# Patient Record
Sex: FEMALE | Race: ASIAN | NOT HISPANIC OR LATINO | Employment: FULL TIME | URBAN - METROPOLITAN AREA
[De-identification: names, ages, dates, MRNs, and addresses within clinical notes are randomized per-mention and may not be internally consistent; named-entity substitution may affect disease eponyms.]

---

## 2024-03-07 ENCOUNTER — OFFICE VISIT (OUTPATIENT)
Dept: OBSTETRICS AND GYNECOLOGY | Age: 19
End: 2024-03-07
Payer: MEDICAID

## 2024-03-07 VITALS
SYSTOLIC BLOOD PRESSURE: 108 MMHG | HEIGHT: 62 IN | DIASTOLIC BLOOD PRESSURE: 70 MMHG | WEIGHT: 135 LBS | BODY MASS INDEX: 24.84 KG/M2

## 2024-03-07 DIAGNOSIS — Z34.90 EARLY STAGE OF PREGNANCY: Primary | ICD-10-CM

## 2024-03-07 RX ORDER — PRENATAL VIT/IRON FUM/FOLIC AC 27MG-0.8MG
TABLET ORAL DAILY
COMMUNITY

## 2024-03-07 NOTE — PROGRESS NOTES
"GYN Visit    3/7/2024    Patient: Eryn Humphrey          MR#:[id number]      Chief Complaint   Patient presents with    Possible Pregnancy     New Gyn & Confirmation US - LMP 24, Pt has no pap hx, pt c/o nausea & vomiting at night       History of Present Illness    19 y.o. female  who presents for  new pregnancy visit  New pt to me  Patient is having nausea and vomiting but mildly  Faint positive home pregnancy test on   No vaginal bleeding  She does have some light cramping  She had a Nexplanon removed 2024  No history of prior Pap smear  No history of gonorrhea chlamydia herpes or warts  She declines nausea medications today  She is on a prenatal vitamin  She has had chickenpox in the past  No genetic disorders in her or her boyfriend's family that would affect babies  She is at [employer] in school for [employer] science        Patient's last menstrual period was 2024.    ________________________________________  There is no problem list on file for this patient.      Past Medical History:   Diagnosis Date    Depression 2018       History reviewed. No pertinent surgical history.    Social History     Tobacco Use   Smoking Status Never    Passive exposure: Never   Smokeless Tobacco Never       has a current medication list which includes the following prescription(s): prenatal vitamin 27-0.8.  ________________________________________    Current contraception: none      The following portions of the patient's history were reviewed and updated as appropriate: allergies, current medications, past family history, past medical history, past social history, past surgical history, and problem list.    Review of Systems    Pertinent items are noted in HPI.     Objective   Physical Exam    /70   Ht 157.5 cm (62\")   Wt 61.2 kg (135 lb)   LMP 2024   BMI 24.69 kg/m²    BP Readings from Last 3 Encounters:   24 108/70      Wt Readings from Last 3 Encounters:   24 61.2 kg " "(135 lb)      BMI: Estimated body mass index is 24.69 kg/m² as calculated from the following:    Height as of this encounter: 157.5 cm (62\").    Weight as of this encounter: 61.2 kg (135 lb).    Lungs: non labored breathing, no wheezing or tachpnea  Extremities: extremities normal, atraumatic, no cyanosis or edema  Skin: Skin color, texture, turgor normal. No rashes or lesions  Neurologic: Grossly normal  General:   alert, appears stated age, and cooperative   Abdomen: soft, non-tender, without masses or organomegaly            See ultrasound 5-week embryo with no fetal heart tones today, otherwise normal ultrasound                 Assessment:      Diagnoses and all orders for this visit:    1. Early stage of pregnancy (Primary)      Reviewed ultrasound with patient and her boyfriend  No fetal heart tones today but we might be just a few days early  Recommend follow-up in 1 week for a repeat ultrasound  I did discuss possibility of miscarriage  The patient is having some pregnancy symptoms and her home pregnancy test fits the timeline so I am hopeful that this will be a viable pregnancy  We will defer her labs until next week        "

## 2024-03-08 ENCOUNTER — PATIENT ROUNDING (BHMG ONLY) (OUTPATIENT)
Dept: OBSTETRICS AND GYNECOLOGY | Age: 19
End: 2024-03-08
Payer: MEDICAID

## 2024-03-14 ENCOUNTER — OFFICE VISIT (OUTPATIENT)
Dept: OBSTETRICS AND GYNECOLOGY | Age: 19
End: 2024-03-14
Payer: MEDICAID

## 2024-03-14 VITALS
WEIGHT: 132 LBS | HEIGHT: 62 IN | DIASTOLIC BLOOD PRESSURE: 72 MMHG | BODY MASS INDEX: 24.29 KG/M2 | SYSTOLIC BLOOD PRESSURE: 110 MMHG

## 2024-03-14 DIAGNOSIS — Z3A.01 6 WEEKS GESTATION OF PREGNANCY: ICD-10-CM

## 2024-03-14 DIAGNOSIS — O21.9 NAUSEA AND VOMITING OF PREGNANCY, ANTEPARTUM: ICD-10-CM

## 2024-03-14 DIAGNOSIS — Z34.90 EARLY STAGE OF PREGNANCY: Primary | ICD-10-CM

## 2024-03-14 RX ORDER — ONDANSETRON 4 MG/1
4 TABLET, FILM COATED ORAL EVERY 8 HOURS PRN
Qty: 30 TABLET | Refills: 1 | Status: SHIPPED | OUTPATIENT
Start: 2024-03-14 | End: 2025-03-14

## 2024-03-14 RX ORDER — PROMETHAZINE HYDROCHLORIDE 12.5 MG/1
12.5 TABLET ORAL EVERY 6 HOURS PRN
Qty: 30 TABLET | Refills: 1 | Status: SHIPPED | OUTPATIENT
Start: 2024-03-14

## 2024-03-14 NOTE — PROGRESS NOTES
"GYN Visit    3/14/2024    Patient: Eryn Humphrey          MR#:[id number]      Chief Complaint   Patient presents with    Possible Pregnancy     Gyn F/u & Viability US - LMP 24, Pt c/o nausea, Pt stating she had 2 days of light pink spotting but states she has not had any today       History of Present Illness    19 y.o. female  who presents for follow-up to dating ultrasound    The patient has been having some nausea and vomiting  She is able to keep down her prenatal vitamin  Ultrasound was done today and it showed a viable 6-week intrauterine pregnancy  Her due date will be 2024 based on this ultrasound  Labs done today  Follow-up 4 weeks for zurima        Patient's last menstrual period was 2024.    ________________________________________  There is no problem list on file for this patient.      Past Medical History:   Diagnosis Date    Depression 2018       History reviewed. No pertinent surgical history.    Social History     Tobacco Use   Smoking Status Never    Passive exposure: Never   Smokeless Tobacco Never       has a current medication list which includes the following prescription(s): prenatal vitamin 27-0.8, ondansetron, and promethazine.  ________________________________________    Current contraception: none      The following portions of the patient's history were reviewed and updated as appropriate: allergies, current medications, past family history, past medical history, past social history, past surgical history, and problem list.    Review of Systems    Pertinent items are noted in HPI.     Objective   Physical Exam    /72   Ht 157.5 cm (62\")   Wt 59.9 kg (132 lb)   LMP 2024   BMI 24.14 kg/m²    BP Readings from Last 3 Encounters:   24 110/72   24 108/70      Wt Readings from Last 3 Encounters:   24 59.9 kg (132 lb)   24 61.2 kg (135 lb)      BMI: Estimated body mass index is 24.14 kg/m² as calculated from the following:    " "Height as of this encounter: 157.5 cm (62\").    Weight as of this encounter: 59.9 kg (132 lb).    Lungs: non labored breathing, no wheezing or tachpnea  Extremities: extremities normal, atraumatic, no cyanosis or edema  Skin: Skin color, texture, turgor normal. No rashes or lesions  Neurologic: Grossly normal  General:   alert, appears stated age, and cooperative   Abdomen: soft, non-tender, without masses or organomegaly            See ultrasound report viable IUP at 6 weeks  She will be dated by this ultrasound which places her due date at 11/4/2024                 Assessment:      Diagnoses and all orders for this visit:    1. Early stage of pregnancy (Primary)    2. 6 weeks gestation of pregnancy  -     OB Panel With HIV  -     Urine Culture - Urine, Urine, Clean Catch  -     Chlamydia / Gonococcus / Mycoplasma Genitalium, ALEXEY, Urine - Urine, Clean Catch    3. Nausea and vomiting of pregnancy, antepartum    Other orders  -     promethazine (PHENERGAN) 12.5 MG tablet; Take 1 tablet by mouth Every 6 (Six) Hours As Needed for Nausea or Vomiting.  Dispense: 30 tablet; Refill: 1  -     ondansetron (Zofran) 4 MG tablet; Take 1 tablet by mouth Every 8 (Eight) Hours As Needed for Nausea or Vomiting.  Dispense: 30 tablet; Refill: 1      Follow-up 4 weeks for panorama and Horizon        "

## 2024-03-16 LAB
BACTERIA UR CULT: NO GROWTH
BACTERIA UR CULT: NORMAL

## 2024-03-17 LAB
C TRACH RRNA UR QL NAA+PROBE: NEGATIVE
M GENITALIUM DNA UR QL NAA+PROBE: NEGATIVE
N GONORRHOEA RRNA UR QL NAA+PROBE: NEGATIVE

## 2024-03-18 ENCOUNTER — LAB (OUTPATIENT)
Facility: HOSPITAL | Age: 19
End: 2024-03-18
Payer: MEDICAID

## 2024-03-18 LAB
ABO GROUP BLD: NORMAL
BASOPHILS # BLD AUTO: 0.02 10*3/MM3 (ref 0–0.2)
BASOPHILS NFR BLD AUTO: 0.3 % (ref 0–1.5)
BLD GP AB SCN SERPL QL: NEGATIVE
DEPRECATED RDW RBC AUTO: 36.8 FL (ref 37–54)
EOSINOPHIL # BLD AUTO: 0.06 10*3/MM3 (ref 0–0.4)
EOSINOPHIL NFR BLD AUTO: 0.8 % (ref 0.3–6.2)
ERYTHROCYTE [DISTWIDTH] IN BLOOD BY AUTOMATED COUNT: 12.2 % (ref 12.3–15.4)
HBV SURFACE AG SERPL QL IA: NORMAL
HCT VFR BLD AUTO: 42.2 % (ref 34–46.6)
HCV AB SER DONR QL: NORMAL
HGB BLD-MCNC: 13.7 G/DL (ref 12–15.9)
HIV 1+2 AB+HIV1 P24 AG SERPL QL IA: NORMAL
IMM GRANULOCYTES # BLD AUTO: 0.02 10*3/MM3 (ref 0–0.05)
IMM GRANULOCYTES NFR BLD AUTO: 0.3 % (ref 0–0.5)
LYMPHOCYTES # BLD AUTO: 2.21 10*3/MM3 (ref 0.7–3.1)
LYMPHOCYTES NFR BLD AUTO: 28.7 % (ref 19.6–45.3)
MCH RBC QN AUTO: 26.7 PG (ref 26.6–33)
MCHC RBC AUTO-ENTMCNC: 32.5 G/DL (ref 31.5–35.7)
MCV RBC AUTO: 82.3 FL (ref 79–97)
MONOCYTES # BLD AUTO: 0.7 10*3/MM3 (ref 0.1–0.9)
MONOCYTES NFR BLD AUTO: 9.1 % (ref 5–12)
NEUTROPHILS NFR BLD AUTO: 4.68 10*3/MM3 (ref 1.7–7)
NEUTROPHILS NFR BLD AUTO: 60.8 % (ref 42.7–76)
NRBC BLD AUTO-RTO: 0 /100 WBC (ref 0–0.2)
PLATELET # BLD AUTO: 335 10*3/MM3 (ref 140–450)
PMV BLD AUTO: 9.4 FL (ref 6–12)
RBC # BLD AUTO: 5.13 10*6/MM3 (ref 3.77–5.28)
RH BLD: POSITIVE
RPR SER QL: NORMAL
WBC NRBC COR # BLD AUTO: 7.69 10*3/MM3 (ref 3.4–10.8)

## 2024-03-19 LAB — RUBV IGG SERPL IA-ACNC: 16.4 INDEX

## 2024-04-04 ENCOUNTER — HOSPITAL ENCOUNTER (EMERGENCY)
Facility: HOSPITAL | Age: 19
Discharge: HOME | End: 2024-04-04
Attending: EMERGENCY MEDICINE
Payer: MEDICAID

## 2024-04-04 ENCOUNTER — TELEPHONE (OUTPATIENT)
Dept: OBSTETRICS AND GYNECOLOGY | Age: 19
End: 2024-04-04
Payer: MEDICAID

## 2024-04-04 VITALS
OXYGEN SATURATION: 100 % | HEIGHT: 62 IN | HEART RATE: 77 BPM | DIASTOLIC BLOOD PRESSURE: 67 MMHG | BODY MASS INDEX: 24.29 KG/M2 | SYSTOLIC BLOOD PRESSURE: 116 MMHG | TEMPERATURE: 98 F | WEIGHT: 132 LBS | RESPIRATION RATE: 18 BRPM

## 2024-04-04 DIAGNOSIS — O21.9 NAUSEA AND VOMITING DURING PREGNANCY: ICD-10-CM

## 2024-04-04 DIAGNOSIS — N39.0 ACUTE UTI: Primary | ICD-10-CM

## 2024-04-04 LAB
ALBUMIN SERPL-MCNC: 4.3 G/DL (ref 3.5–5.2)
ALBUMIN/GLOB SERPL: 1.3 G/DL
ALP SERPL-CCNC: 65 U/L (ref 39–117)
ALT SERPL W P-5'-P-CCNC: 10 U/L (ref 1–33)
ANION GAP SERPL CALCULATED.3IONS-SCNC: 9.4 MMOL/L (ref 5–15)
AST SERPL-CCNC: 14 U/L (ref 1–32)
BACTERIA UR QL AUTO: ABNORMAL /HPF
BASOPHILS # BLD AUTO: 0.02 10*3/MM3 (ref 0–0.2)
BASOPHILS NFR BLD AUTO: 0.2 % (ref 0–1.5)
BILIRUB SERPL-MCNC: 0.3 MG/DL (ref 0–1.2)
BILIRUB UR QL STRIP: NEGATIVE
BUN SERPL-MCNC: 9 MG/DL (ref 6–20)
BUN/CREAT SERPL: 15 (ref 7–25)
CALCIUM SPEC-SCNC: 9.2 MG/DL (ref 8.6–10.5)
CHLORIDE SERPL-SCNC: 103 MMOL/L (ref 98–107)
CLARITY UR: ABNORMAL
CO2 SERPL-SCNC: 23.6 MMOL/L (ref 22–29)
COLOR UR: YELLOW
CREAT SERPL-MCNC: 0.6 MG/DL (ref 0.57–1)
DEPRECATED RDW RBC AUTO: 37.4 FL (ref 37–54)
EGFRCR SERPLBLD CKD-EPI 2021: 131.2 ML/MIN/1.73
EOSINOPHIL # BLD AUTO: 0.05 10*3/MM3 (ref 0–0.4)
EOSINOPHIL NFR BLD AUTO: 0.5 % (ref 0.3–6.2)
ERYTHROCYTE [DISTWIDTH] IN BLOOD BY AUTOMATED COUNT: 12.8 % (ref 12.3–15.4)
GLOBULIN UR ELPH-MCNC: 3.2 GM/DL
GLUCOSE SERPL-MCNC: 93 MG/DL (ref 65–99)
GLUCOSE UR STRIP-MCNC: NEGATIVE MG/DL
HCG INTACT+B SERPL-ACNC: NORMAL MIU/ML
HCT VFR BLD AUTO: 40.9 % (ref 34–46.6)
HGB BLD-MCNC: 13.5 G/DL (ref 12–15.9)
HGB UR QL STRIP.AUTO: NEGATIVE
HOLD SPECIMEN: NORMAL
HOLD SPECIMEN: NORMAL
HYALINE CASTS UR QL AUTO: ABNORMAL /LPF
IMM GRANULOCYTES # BLD AUTO: 0.04 10*3/MM3 (ref 0–0.05)
IMM GRANULOCYTES NFR BLD AUTO: 0.4 % (ref 0–0.5)
KETONES UR QL STRIP: ABNORMAL
LEUKOCYTE ESTERASE UR QL STRIP.AUTO: ABNORMAL
LYMPHOCYTES # BLD AUTO: 2.43 10*3/MM3 (ref 0.7–3.1)
LYMPHOCYTES NFR BLD AUTO: 23.2 % (ref 19.6–45.3)
MCH RBC QN AUTO: 27.1 PG (ref 26.6–33)
MCHC RBC AUTO-ENTMCNC: 33 G/DL (ref 31.5–35.7)
MCV RBC AUTO: 82.1 FL (ref 79–97)
MONOCYTES # BLD AUTO: 0.79 10*3/MM3 (ref 0.1–0.9)
MONOCYTES NFR BLD AUTO: 7.5 % (ref 5–12)
NEUTROPHILS NFR BLD AUTO: 68.2 % (ref 42.7–76)
NEUTROPHILS NFR BLD AUTO: 7.14 10*3/MM3 (ref 1.7–7)
NITRITE UR QL STRIP: NEGATIVE
NRBC BLD AUTO-RTO: 0 /100 WBC (ref 0–0.2)
PH UR STRIP.AUTO: 6 [PH] (ref 5–8)
PLATELET # BLD AUTO: 359 10*3/MM3 (ref 140–450)
PMV BLD AUTO: 9.3 FL (ref 6–12)
POTASSIUM SERPL-SCNC: 4.1 MMOL/L (ref 3.5–5.2)
PROT SERPL-MCNC: 7.5 G/DL (ref 6–8.5)
PROT UR QL STRIP: ABNORMAL
RBC # BLD AUTO: 4.98 10*6/MM3 (ref 3.77–5.28)
RBC # UR STRIP: ABNORMAL /HPF
REF LAB TEST METHOD: ABNORMAL
SODIUM SERPL-SCNC: 136 MMOL/L (ref 136–145)
SP GR UR STRIP: >=1.03 (ref 1–1.03)
SQUAMOUS #/AREA URNS HPF: ABNORMAL /HPF
UROBILINOGEN UR QL STRIP: ABNORMAL
WBC # UR STRIP: ABNORMAL /HPF
WBC NRBC COR # BLD AUTO: 10.47 10*3/MM3 (ref 3.4–10.8)
WHOLE BLOOD HOLD COAG: NORMAL
WHOLE BLOOD HOLD SPECIMEN: NORMAL

## 2024-04-04 PROCEDURE — 96361 HYDRATE IV INFUSION ADD-ON: CPT

## 2024-04-04 PROCEDURE — 81001 URINALYSIS AUTO W/SCOPE: CPT | Performed by: NURSE PRACTITIONER

## 2024-04-04 PROCEDURE — 99283 EMERGENCY DEPT VISIT LOW MDM: CPT

## 2024-04-04 PROCEDURE — 25810000003 SODIUM CHLORIDE 0.9 % SOLUTION: Performed by: NURSE PRACTITIONER

## 2024-04-04 PROCEDURE — 96374 THER/PROPH/DIAG INJ IV PUSH: CPT

## 2024-04-04 PROCEDURE — 87086 URINE CULTURE/COLONY COUNT: CPT | Performed by: EMERGENCY MEDICINE

## 2024-04-04 PROCEDURE — 85025 COMPLETE CBC W/AUTO DIFF WBC: CPT | Performed by: NURSE PRACTITIONER

## 2024-04-04 PROCEDURE — 80053 COMPREHEN METABOLIC PANEL: CPT | Performed by: NURSE PRACTITIONER

## 2024-04-04 PROCEDURE — 25010000002 ONDANSETRON PER 1 MG: Performed by: NURSE PRACTITIONER

## 2024-04-04 PROCEDURE — 84702 CHORIONIC GONADOTROPIN TEST: CPT | Performed by: NURSE PRACTITIONER

## 2024-04-04 RX ORDER — CEPHALEXIN 500 MG/1
500 CAPSULE ORAL ONCE
Status: COMPLETED | OUTPATIENT
Start: 2024-04-04 | End: 2024-04-04

## 2024-04-04 RX ORDER — ONDANSETRON 2 MG/ML
4 INJECTION INTRAMUSCULAR; INTRAVENOUS ONCE
Status: COMPLETED | OUTPATIENT
Start: 2024-04-04 | End: 2024-04-04

## 2024-04-04 RX ORDER — CEPHALEXIN 500 MG/1
500 CAPSULE ORAL 3 TIMES DAILY
Qty: 21 CAPSULE | Refills: 0 | Status: SHIPPED | OUTPATIENT
Start: 2024-04-04 | End: 2024-04-11

## 2024-04-04 RX ORDER — SODIUM CHLORIDE 0.9 % (FLUSH) 0.9 %
10 SYRINGE (ML) INJECTION AS NEEDED
Status: DISCONTINUED | OUTPATIENT
Start: 2024-04-04 | End: 2024-04-04 | Stop reason: HOSPADM

## 2024-04-04 RX ADMIN — ONDANSETRON 4 MG: 2 INJECTION INTRAMUSCULAR; INTRAVENOUS at 20:25

## 2024-04-04 RX ADMIN — SODIUM CHLORIDE 1000 ML: 9 INJECTION, SOLUTION INTRAVENOUS at 20:20

## 2024-04-04 RX ADMIN — CEPHALEXIN 500 MG: 500 CAPSULE ORAL at 21:27

## 2024-04-04 NOTE — TELEPHONE ENCOUNTER
Please advise pt to go to ER for IV fluids if she is not able to hold down anything.   I called her and left a VM as well

## 2024-04-04 NOTE — TELEPHONE ENCOUNTER
----- Message from Eryn Humphrey sent at 4/4/2024  4:31 PM EDT -----  Regarding: Next appt  Contact: [phone]  Hi, sorry to bother but for the past two days I haven’t been able to keep anything down , not even a sip of water. I have been able to chew on some ice chips and I have taken zofran but I still manage to vomit. My concern is on march 31 I weigh myself and it was 138 and then on April 3 I reweigh myself and it was 132. I have an anxious feeling that maybe the baby isn’t getting enough nutrients , as I am also not able to keep down my prenatal and I’m afraid of miscarriage. Will a Doppler be enough on our next appointment to make sure that the baby is still growing? Thank you for your time.

## 2024-04-05 NOTE — DISCHARGE INSTRUCTIONS
You have been given emergency department evaluation.  This evaluation is intended to rule out life-threatening conditions.  Is not a complete evaluation.  You could require further testing as determined by your primary care physician or any referred specialist.  Please follow-up with all doctors that you are referred to.  Please be sure to take your prescribed medications and follow any specific instructions in the discharge instructions.  Please follow-up with your primary care physician within 48 hours.  Please have your primary care provider recheck your blood pressure.  Rest.  Drink plenty of fluids, stay hydrated.  Follow-up with your obgyn for further evaluation and management of symptoms.  Please return to the emergency department if you experience chest pain, shortness of breath, abdominal pain, fever greater than 102, intractable vomiting.  Please return to the emergency department if your symptoms continue or worsen, or if you begin to experience any other concerning symptom.

## 2024-04-05 NOTE — ED PROVIDER NOTES
EMERGENCY DEPARTMENT ENCOUNTER  Room Number:  26/26  PCP: Jo Leo APRN  Independent Historians: Patient      HPI:  Chief Complaint: had concerns including Vomiting During Pregnancy.     A complete HPI/ROS/PMH/PSH/SH/FH are unobtainable due to: None    Chronic or social conditions impacting patient care (Social Determinants of Health): None      Context: Eryn Humphrey is a 19 y.o. female who presents to the emergency department with complaints of nausea and vomiting.  Symptoms started 3 days ago.  Patient reports she is currently 9 weeks pregnant.  This is her first pregnancy.  She is followed by Dr. Delgado with OB/GYN.  Patient is unaware of any alleviating or aggravating factors.  Patient denies fever, chills, headache, lightheadedness, dizziness, numbness, tingling, unilateral extremity weakness, syncope, shortness of breath, chest pain, abdominal pain, diarrhea, dysuria, hematuria, vaginal bleeding, vaginal discharge, or other complaints.    Review of prior external notes (non-ED) -and- Review of prior external test results outside of this encounter:   March 14, 2024: OB/GYN office visit.  Patient was seen by Dr. Dawkins/Danny.  It was noted her last menstrual cycle was January 21, 2024.  Patient had verbalized some nausea and vomiting.  Medication list included prenatal vitamin, 3 days withdrawn, and promethazine.    PAST MEDICAL HISTORY  Active Ambulatory Problems     Diagnosis Date Noted    No Active Ambulatory Problems     Resolved Ambulatory Problems     Diagnosis Date Noted    No Resolved Ambulatory Problems     Past Medical History:   Diagnosis Date    Depression 12/2018         PAST SURGICAL HISTORY  No past surgical history on file.      FAMILY HISTORY  Family History   Problem Relation Age of Onset    Diabetes Maternal Grandmother     Hypertension Maternal Grandmother     Breast cancer Paternal Grandmother          SOCIAL HISTORY  Social History     Socioeconomic History    Marital status:     Tobacco Use    Smoking status: Never     Passive exposure: Never    Smokeless tobacco: Never   Vaping Use    Vaping status: Never Used   Substance and Sexual Activity    Alcohol use: Never    Drug use: Never    Sexual activity: Yes     Partners: Male     Birth control/protection: None         ALLERGIES  Patient has no known allergies.      REVIEW OF SYSTEMS  Included in HPI  All systems reviewed and negative except for those discussed in HPI.      PHYSICAL EXAM    I have reviewed the triage vital signs and nursing notes.    ED Triage Vitals   Temp Heart Rate Resp BP SpO2   04/04/24 1955 04/04/24 1955 04/04/24 1955 04/04/24 2001 04/04/24 1955   98 °F (36.7 °C) 89 18 128/83 98 %      Temp src Heart Rate Source Patient Position BP Location FiO2 (%)   04/04/24 1955 04/04/24 1955 04/04/24 2001 04/04/24 2001 --   Tympanic Monitor Sitting Right arm        GENERAL: not distressed  HENT: nares patent  Head/neck/ face are symmetric without gross deformity or swelling  EYES: no scleral icterus  CV: regular rhythm, regular rate with intact distal pulses  RESPIRATORY: normal effort and no respiratory distress  ABDOMEN: soft and non-tender  MUSCULOSKELETAL: no deformity  NEURO: alert and appropriate, moves all extremities, follows commands  SKIN: warm, dry    Vital signs and nursing notes reviewed.      LAB RESULTS  Recent Results (from the past 24 hour(s))   Green Top (Gel)    Collection Time: 04/04/24  8:16 PM   Result Value Ref Range    Extra Tube Hold for add-ons.    Lavender Top    Collection Time: 04/04/24  8:16 PM   Result Value Ref Range    Extra Tube hold for add-on    Gold Top - SST    Collection Time: 04/04/24  8:16 PM   Result Value Ref Range    Extra Tube Hold for add-ons.    Light Blue Top    Collection Time: 04/04/24  8:16 PM   Result Value Ref Range    Extra Tube Hold for add-ons.    Comprehensive Metabolic Panel    Collection Time: 04/04/24  8:16 PM    Specimen: Blood   Result Value Ref Range    Glucose  93 65 - 99 mg/dL    BUN 9 6 - 20 mg/dL    Creatinine 0.60 0.57 - 1.00 mg/dL    Sodium 136 136 - 145 mmol/L    Potassium 4.1 3.5 - 5.2 mmol/L    Chloride 103 98 - 107 mmol/L    CO2 23.6 22.0 - 29.0 mmol/L    Calcium 9.2 8.6 - 10.5 mg/dL    Total Protein 7.5 6.0 - 8.5 g/dL    Albumin 4.3 3.5 - 5.2 g/dL    ALT (SGPT) 10 1 - 33 U/L    AST (SGOT) 14 1 - 32 U/L    Alkaline Phosphatase 65 39 - 117 U/L    Total Bilirubin 0.3 0.0 - 1.2 mg/dL    Globulin 3.2 gm/dL    A/G Ratio 1.3 g/dL    BUN/Creatinine Ratio 15.0 7.0 - 25.0    Anion Gap 9.4 5.0 - 15.0 mmol/L    eGFR 131.2 >60.0 mL/min/1.73   hCG, Quantitative, Pregnancy    Collection Time: 04/04/24  8:16 PM    Specimen: Blood   Result Value Ref Range    HCG Quantitative 77,082.00 mIU/mL   CBC Auto Differential    Collection Time: 04/04/24  8:16 PM    Specimen: Blood   Result Value Ref Range    WBC 10.47 3.40 - 10.80 10*3/mm3    RBC 4.98 3.77 - 5.28 10*6/mm3    Hemoglobin 13.5 12.0 - 15.9 g/dL    Hematocrit 40.9 34.0 - 46.6 %    MCV 82.1 79.0 - 97.0 fL    MCH 27.1 26.6 - 33.0 pg    MCHC 33.0 31.5 - 35.7 g/dL    RDW 12.8 12.3 - 15.4 %    RDW-SD 37.4 37.0 - 54.0 fl    MPV 9.3 6.0 - 12.0 fL    Platelets 359 140 - 450 10*3/mm3    Neutrophil % 68.2 42.7 - 76.0 %    Lymphocyte % 23.2 19.6 - 45.3 %    Monocyte % 7.5 5.0 - 12.0 %    Eosinophil % 0.5 0.3 - 6.2 %    Basophil % 0.2 0.0 - 1.5 %    Immature Grans % 0.4 0.0 - 0.5 %    Neutrophils, Absolute 7.14 (H) 1.70 - 7.00 10*3/mm3    Lymphocytes, Absolute 2.43 0.70 - 3.10 10*3/mm3    Monocytes, Absolute 0.79 0.10 - 0.90 10*3/mm3    Eosinophils, Absolute 0.05 0.00 - 0.40 10*3/mm3    Basophils, Absolute 0.02 0.00 - 0.20 10*3/mm3    Immature Grans, Absolute 0.04 0.00 - 0.05 10*3/mm3    nRBC 0.0 0.0 - 0.2 /100 WBC   Urinalysis With Microscopic If Indicated (No Culture) - Urine, Clean Catch    Collection Time: 04/04/24  8:24 PM    Specimen: Urine, Clean Catch   Result Value Ref Range    Color, UA Yellow Yellow, Straw    Appearance, UA  Cloudy (A) Clear    pH, UA 6.0 5.0 - 8.0    Specific Gravity, UA >=1.030 1.005 - 1.030    Glucose, UA Negative Negative    Ketones, UA 15 mg/dL (1+) (A) Negative    Bilirubin, UA Negative Negative    Blood, UA Negative Negative    Protein, UA Trace (A) Negative    Leuk Esterase, UA Small (1+) (A) Negative    Nitrite, UA Negative Negative    Urobilinogen, UA 1.0 E.U./dL 0.2 - 1.0 E.U./dL   Urinalysis, Microscopic Only - Urine, Clean Catch    Collection Time: 04/04/24  8:24 PM    Specimen: Urine, Clean Catch   Result Value Ref Range    RBC, UA 0-2 None Seen, 0-2 /HPF    WBC, UA 21-50 (A) None Seen, 0-2 /HPF    Bacteria, UA 3+ (A) None Seen /HPF    Squamous Epithelial Cells, UA 7-12 (A) None Seen, 0-2 /HPF    Hyaline Casts, UA 7-12 None Seen /LPF    Methodology Automated Microscopy          RADIOLOGY  No Radiology Exams Resulted Within Past 24 Hours      MEDICATIONS GIVEN IN ER  Medications   sodium chloride 0.9 % flush 10 mL (has no administration in time range)   sodium chloride 0.9 % bolus 1,000 mL (0 mL Intravenous Stopped 4/4/24 2144)   ondansetron (ZOFRAN) injection 4 mg (4 mg Intravenous Given 4/4/24 2025)   cephalexin (KEFLEX) capsule 500 mg (500 mg Oral Given 4/4/24 2127)           OUTPATIENT MEDICATION MANAGEMENT:  Current Facility-Administered Medications Ordered in Epic   Medication Dose Route Frequency Provider Last Rate Last Admin    sodium chloride 0.9 % flush 10 mL  10 mL Intravenous PRN Delano Oliva MD         Current Outpatient Medications Ordered in Epic   Medication Sig Dispense Refill    cephalexin (KEFLEX) 500 MG capsule Take 1 capsule by mouth 3 (Three) Times a Day for 7 days. 21 capsule 0    ondansetron (Zofran) 4 MG tablet Take 1 tablet by mouth Every 8 (Eight) Hours As Needed for Nausea or Vomiting. 30 tablet 1    Prenatal Vit-Fe Fumarate-FA (prenatal vitamin 27-0.8) 27-0.8 MG tablet tablet Take  by mouth Daily.      promethazine (PHENERGAN) 12.5 MG tablet Take 1 tablet by mouth Every 6  (Six) Hours As Needed for Nausea or Vomiting. 30 tablet 1             PROGRESS, DATA ANALYSIS, CONSULTS, AND MEDICAL DECISION MAKING  ORDERS PLACED DURING THIS VISIT:  Orders Placed This Encounter   Procedures    Urine Culture - Urine,    [place] Draw    Comprehensive Metabolic Panel    hCG, Quantitative, Pregnancy    Urinalysis With Microscopic If Indicated (No Culture) - Urine, Clean Catch    CBC Auto Differential    Urinalysis, Microscopic Only - Urine, Clean Catch    Insert peripheral IV    Green Top (Gel)    Lavender Top    Gold Top - SST    Light Blue Top    CBC & Differential       All labs have been independently interpreted by me.  All radiology studies have been reviewed by me. All EKG's have been independently viewed by me.  Discussion below represents my analysis of pertinent findings related to patient's condition, differential diagnosis, treatment plan and final disposition.    Differential diagnosis includes but is not limited to:   Hyperemesis gravidarum, gastroenteritis, UTI, etc.    ED Course/Progress Notes/MDM:  ED Course as of 04/04/24 2147   Thu Apr 04, 2024 2032 I discussed the case with Dr. Oliva and they agree to evaluate the patient at the bedside.    [AR]   2044 WBC: 10.47 [DC]   2044 Hemoglobin: 13.5 [DC]   2044 Glucose: 93 [DC]   2044 BUN: 9 [DC]   2044 Creatinine: 0.60 [DC]   2044 Sodium: 136 [DC]   2044 Potassium: 4.1 [DC]   2044 Squamous Epithelial Cells, UA(!): 7-12 [DC]   2044 Bacteria, UA(!): 3+ [DC]   2044 WBC, UA(!): 21-50 [DC]   2044 Nitrite, UA: Negative [DC]   2044 Leukocytes, UA(!): Small (1+) [DC]   2044 Ketones, UA(!): 15 mg/dL (1+) [DC]   2058 Ketones, UA(!): 15 mg/dL (1+) [AR]   2058 Leukocytes, UA(!): Small (1+) [AR]   2058 WBC, UA(!): 21-50 [AR]   2058 Bacteria, UA(!): 3+ [AR]   2058 Squamous Epithelial Cells, UA(!): 7-12 [AR]   2058 HCG Quantitative: 77,082.00 [AR]   2147 You have been given emergency department evaluation.  This evaluation is intended to rule out  life-threatening conditions.  Is not a complete evaluation.  You could require further testing as determined by your primary care physician or any referred specialist.  Please follow-up with all doctors that you are referred to.  Please be sure to take your prescribed medications and follow any specific instructions in the discharge instructions.  Please follow-up with your primary care physician within 48 hours.  Please have your primary care provider recheck your blood pressure.  Please return to the emergency department if you experience chest pain, shortness of breath, abdominal pain, fever greater than 102, intractable vomiting.  Please return to the emergency department if your symptoms continue or worsen, or if you begin to experience any other concerning symptom.    Patient able to tolerate po fluids without nausea or vomiting. [AR]      ED Course User Index  [AR] Ada Watson APRN  [DC] Delano Oliva MD     D/C Instructions:  You have been given emergency department evaluation.  This evaluation is intended to rule out life-threatening conditions.  Is not a complete evaluation.  You could require further testing as determined by your primary care physician or any referred specialist.  Please follow-up with all doctors that you are referred to.  Please be sure to take your prescribed medications and follow any specific instructions in the discharge instructions.  Please follow-up with your primary care physician within 48 hours.  Please have your primary care provider recheck your blood pressure.  Rest.  Drink plenty of fluids, stay hydrated.  Follow-up with your obgyn for further evaluation and management of symptoms.  Please return to the emergency department if you experience chest pain, shortness of breath, abdominal pain, fever greater than 102, intractable vomiting.  Please return to the emergency department if your symptoms continue or worsen, or if you begin to experience any other concerning  symptom.      AS OF 21:47 EDT VITALS:    BP - 116/67  HR - 77  TEMP - 98 °F (36.7 °C) (Tympanic)  O2 SATS - 100%        MDM:  Patient is a 19-year-old female who presents to the emergency department with complaints of nausea and vomiting over the last 3 days.  Patient is currently 9 weeks pregnant.  She tried Zofran without any relief.  Advised patient to take Phenergan for nausea and vomiting as well as she was prescribed this by her OB/GYN.  Patient had symptomatic relief while in ED.  She is able to tolerate p.o. fluids.  Urinalysis reveals bacteria, treated with Keflex.  Advise follow-up with OB/GYN.  Strict return precautions given.      COMPLEXITY OF CARE  Admission was considered but after careful review of the patient's presentation, physical examination, diagnostic results, and response to treatment the patient may be safely discharged with outpatient follow-up.        DIAGNOSIS  Final diagnoses:   Acute UTI   Nausea and vomiting during pregnancy         DISPOSITION  ED Disposition       ED Disposition   Discharge    Condition   Stable    Comment   --                    Please note that portions of this document were completed with a voice recognition program.    Note Disclaimer: At [facility], we believe that sharing information builds trust and better relationships. You are receiving this note because you recently visited [facility]. It is possible you will see health information before a provider has talked with you about it. This kind of information can be easy to misunderstand. To help you fully understand what it means for your health, we urge you to discuss this note with your provider.     Ada Watson, LUBA  04/04/24 0786

## 2024-04-05 NOTE — ED PROVIDER NOTES
Pt presents to the ED c/o intermittent nausea and vomiting of early pregnancy for the last 3 weeks, worse over the last 3 days with decreased urinary output.  No fevers or chills.  No vaginal bleeding.  Reports being approximately 9 weeks pregnant.  This is her first pregnancy.  No dysuria.     On exam,   General: No acute distress, nontoxic  HEENT: Mucous membranes moist, atraumatic, EOMI  Neck: Full ROM  Pulm: Symmetric chest rise, nonlabored, lungs CTAB  Cardiovascular: Regular rate and rhythm, intact distal pulses  GI: Soft, nontender, nondistended, no rebound, no guarding, bowel sounds present  MSK: Full ROM, no deformity  Skin: Warm, dry  Neuro: Awake, alert, oriented x 4, GCS 15, moving all extremities, no focal deficits  Psych: Calm, cooperative          Plan: Initial concern for dehydration, renal failure, liver failure, electrolyte abnormalities, among others.  Plan for labs, IV fluids, symptomatic control, and reevaluation with results.    ED Course as of 04/04/24 2235   Thu Apr 04, 2024 2032 I discussed the case with Dr. Oliva and they agree to evaluate the patient at the bedside.    [AR]   2044 WBC: 10.47 [DC]   2044 Hemoglobin: 13.5 [DC]   2044 Glucose: 93 [DC]   2044 BUN: 9 [DC]   2044 Creatinine: 0.60 [DC]   2044 Sodium: 136 [DC]   2044 Potassium: 4.1 [DC]   2044 Squamous Epithelial Cells, UA(!): 7-12 [DC]   2044 Bacteria, UA(!): 3+ [DC]   2044 WBC, UA(!): 21-50 [DC]   2044 Nitrite, UA: Negative [DC]   2044 Leukocytes, UA(!): Small (1+) [DC]   2044 Ketones, UA(!): 15 mg/dL (1+) [DC]   2058 Ketones, UA(!): 15 mg/dL (1+) [AR]   2058 Leukocytes, UA(!): Small (1+) [AR]   2058 WBC, UA(!): 21-50 [AR]   2058 Bacteria, UA(!): 3+ [AR]   2058 Squamous Epithelial Cells, UA(!): 7-12 [AR]   2058 HCG Quantitative: 77,082.00 [AR]   2147 You have been given emergency department evaluation.  This evaluation is intended to rule out life-threatening conditions.  Is not a complete evaluation.  You could require further  testing as determined by your primary care physician or any referred specialist.  Please follow-up with all doctors that you are referred to.  Please be sure to take your prescribed medications and follow any specific instructions in the discharge instructions.  Please follow-up with your primary care physician within 48 hours.  Please have your primary care provider recheck your blood pressure.  Please return to the emergency department if you experience chest pain, shortness of breath, abdominal pain, fever greater than 102, intractable vomiting.  Please return to the emergency department if your symptoms continue or worsen, or if you begin to experience any other concerning symptom.    Patient able to tolerate po fluids without nausea or vomiting. [AR]      ED Course User Index  [AR] Ada Watson APRN  [DC] Delano Oliva MD       Reassuring workup today, plan for outpatient OB/GYN follow-up, ED return for worsening symptoms as needed.     MD Attestation Note    SHARED VISIT: This visit was performed by BOTH a physician and an APC. The substantive portion of the medical decision making was performed by this attesting physician who made or approved the management plan and takes responsibility for patient management. All studies in the APC note (if performed) were independently interpreted by me.                   Delano Oliva MD  04/04/24 7810

## 2024-04-06 LAB — BACTERIA SPEC AEROBE CULT: ABNORMAL

## 2024-04-12 ENCOUNTER — ROUTINE PRENATAL (OUTPATIENT)
Dept: OBSTETRICS AND GYNECOLOGY | Age: 19
End: 2024-04-12
Payer: MEDICAID

## 2024-04-12 VITALS — SYSTOLIC BLOOD PRESSURE: 104 MMHG | DIASTOLIC BLOOD PRESSURE: 62 MMHG | WEIGHT: 133 LBS | BODY MASS INDEX: 24.33 KG/M2

## 2024-04-12 DIAGNOSIS — Z3A.10 10 WEEKS GESTATION OF PREGNANCY: Primary | ICD-10-CM

## 2024-04-12 DIAGNOSIS — Z31.430 ENCOUNTER OF FEMALE FOR TESTING FOR GENETIC DISEASE CARRIER STATUS FOR PROCREATIVE MANAGEMENT: ICD-10-CM

## 2024-04-12 DIAGNOSIS — Z34.81 PRENATAL CARE, SUBSEQUENT PREGNANCY, FIRST TRIMESTER: ICD-10-CM

## 2024-04-12 LAB
GLUCOSE UR STRIP-MCNC: NEGATIVE MG/DL
PROT UR STRIP-MCNC: ABNORMAL MG/DL

## 2024-04-12 RX ORDER — ASPIRIN 81 MG/1
81 TABLET ORAL DAILY
Start: 2024-04-12

## 2024-04-12 NOTE — PROGRESS NOTES
She is feeling better  I recommend she start a baby aspirin at 12 weeks due to being her first pregnancy  She will do the panorama and the Horizon today  Follow-up in 4 weeks    Chief Complaint   Patient presents with    Routine Prenatal Visit     New Ob - Pt is 10w4d, Pt seen in ER on 4/4/24 for acute UTI & nausea/vomiting, Cornelio today, Pt has no complaints today       HPI:  Pt presents for routine prenatal visit    ROS:  No fever or chills, no nausea or vomiting, no contractions, no leg pain, no LE edema, no leaking fluid, no bleeding, no headache, no dysuria  All other systems reviewed and negative    Exam:  See flow sheet  General:  Alert and oriented and no distress  Neck: no lymphadenopathy or thyromegaly  Lungs: non - labored breathing  Abd:  See flow sheet, fundus nontender  Ext: see flow sheet, non-tender bilateral , no lesions  Neuro: grossly normal    Assessment:/ PLAN:    Diagnoses and all orders for this visit:    1. 10 weeks gestation of pregnancy (Primary)  -     POC Urinalysis Dipstick    2. Encounter of female for testing for genetic disease carrier status for procreative management  -     Cornelio Horizon 14 (Pan-Ethnic Standard) - Blood,    3. Prenatal care, subsequent pregnancy, first trimester  -     Cornelio Panorama Prenatal Test: Chromosomes 13, 18, 21, X & Y: Triploidy 22Q.11.2 Deletion - Blood,    Other orders  -     aspirin 81 MG EC tablet; Take 1 tablet by mouth Daily.

## 2024-04-18 RX ORDER — ONDANSETRON 4 MG/1
4 TABLET, FILM COATED ORAL EVERY 8 HOURS PRN
Qty: 30 TABLET | Refills: 1 | Status: SHIPPED | OUTPATIENT
Start: 2024-04-18 | End: 2025-04-18

## 2024-04-24 LAB
Lab: ABNORMAL
Lab: POSITIVE
NTRA ALPHA-THALASSEMIA: POSITIVE
NTRA BETA-HEMOGLOBINOPATHIES: NEGATIVE
NTRA CANAVAN DISEASE: NEGATIVE
NTRA CYSTIC FIBROSIS: NEGATIVE
NTRA DUCHENNE/BECKER MUSCULAR DYSTROPHY: NEGATIVE
NTRA FAMILIAL DYSAUTONOMIA: NEGATIVE
NTRA FRAGILE X SYNDROME: NEGATIVE
NTRA GALACTOSEMIA: NEGATIVE
NTRA GAUCHER DISEASE: NEGATIVE
NTRA MEDIUM CHAIN ACYL-COA DEHYDROGENASE DEFICIENCY: NEGATIVE
NTRA POLYCYSTIC KIDNEY DISEASE, AUTOSOMAL RECESSIVE: NEGATIVE
NTRA SMITH-LEMLI-OPITZ SYNDROME: NEGATIVE
NTRA SPINAL MUSCULAR ATROPHY: NEGATIVE
NTRA TAY-SACHS DISEASE: NEGATIVE

## 2024-05-09 ENCOUNTER — ROUTINE PRENATAL (OUTPATIENT)
Dept: OBSTETRICS AND GYNECOLOGY | Age: 19
End: 2024-05-09
Payer: MEDICAID

## 2024-05-09 VITALS — WEIGHT: 131 LBS | BODY MASS INDEX: 23.96 KG/M2 | SYSTOLIC BLOOD PRESSURE: 102 MMHG | DIASTOLIC BLOOD PRESSURE: 60 MMHG

## 2024-05-09 DIAGNOSIS — Z3A.14 14 WEEKS GESTATION OF PREGNANCY: Primary | ICD-10-CM

## 2024-05-09 DIAGNOSIS — D56.3 ALPHA THALASSEMIA SILENT CARRIER: ICD-10-CM

## 2024-05-09 DIAGNOSIS — Z34.92 PRENATAL CARE, SECOND TRIMESTER: ICD-10-CM

## 2024-05-09 LAB
GLUCOSE UR STRIP-MCNC: NEGATIVE MG/DL
PROT UR STRIP-MCNC: NEGATIVE MG/DL

## 2024-06-13 ENCOUNTER — ROUTINE PRENATAL (OUTPATIENT)
Dept: OBSTETRICS AND GYNECOLOGY | Age: 19
End: 2024-06-13
Payer: MEDICAID

## 2024-06-13 VITALS — WEIGHT: 137 LBS | DIASTOLIC BLOOD PRESSURE: 70 MMHG | BODY MASS INDEX: 25.06 KG/M2 | SYSTOLIC BLOOD PRESSURE: 116 MMHG

## 2024-06-13 DIAGNOSIS — Z13.79 ENCOUNTER FOR GENETIC SCREENING FOR BIRTH DEFECT: ICD-10-CM

## 2024-06-13 DIAGNOSIS — Z36.89 ENCOUNTER FOR FETAL ANATOMIC SURVEY: ICD-10-CM

## 2024-06-13 DIAGNOSIS — Z36.86 ENCOUNTER FOR ANTENATAL SCREENING FOR CERVICAL LENGTH: ICD-10-CM

## 2024-06-13 DIAGNOSIS — Z13.89 SCREENING FOR HEMATURIA OR PROTEINURIA: Primary | ICD-10-CM

## 2024-06-13 DIAGNOSIS — Z3A.19 19 WEEKS GESTATION OF PREGNANCY: ICD-10-CM

## 2024-06-13 LAB
GLUCOSE UR STRIP-MCNC: NEGATIVE MG/DL
PROT UR STRIP-MCNC: NEGATIVE MG/DL

## 2024-06-13 NOTE — PROGRESS NOTES
Patient feels well  She had an anatomy scan today that was complete  No previa seen  Cervical length is 3.24 cm  Vertex presentation  It is a boy  AFP screen will be done today  Follow-up 4 weeks    Chief Complaint   Patient presents with    Routine Prenatal Visit     19w3d, anatomy scan today, pt c/o frequent nose bleeds        HPI:  Pt presents for routine prenatal visit    ROS:  No fever or chills, no nausea or vomiting, no contractions, no leg pain, no LE edema, no leaking fluid, no bleeding, no headache, no dysuria  All other systems reviewed and negative    Exam:  See flow sheet  General:  Alert and oriented and no distress  Neck: no lymphadenopathy or thyromegaly  Lungs: non - labored breathing  Abd:  See flow sheet, fundus nontender  Ext: see flow sheet, non-tender bilateral , no lesions  Neuro: grossly normal    Assessment:/ PLAN:    Diagnoses and all orders for this visit:    1. Screening for hematuria or proteinuria (Primary)  -     POC Urinalysis Dipstick    2. Encounter for fetal anatomic survey    3. Encounter for  screening for cervical length    4. Encounter for genetic screening for birth defect  -     Alpha Fetoprotein, Maternal    5. 19 weeks gestation of pregnancy  -     Alpha Fetoprotein, Maternal

## 2024-06-15 LAB
AFP INTERP SERPL-IMP: NORMAL
AFP INTERP SERPL-IMP: NORMAL
AFP MOM SERPL: 1.15
AFP SERPL-MCNC: 65.2 NG/ML
AGE AT DELIVERY: 21.7 YR
GA METHOD: NORMAL
GA: 19.4 WEEKS
IDDM PATIENT QL: NO
LABORATORY COMMENT REPORT: NORMAL
MULTIPLE PREGNANCY: NO
NEURAL TUBE DEFECT RISK FETUS: 7603 %
RESULT: NORMAL

## 2024-06-25 ENCOUNTER — HOSPITAL ENCOUNTER (EMERGENCY)
Facility: HOSPITAL | Age: 19
Discharge: HOME | End: 2024-06-25
Attending: OBSTETRICS & GYNECOLOGY | Admitting: OBSTETRICS & GYNECOLOGY
Payer: MEDICAID

## 2024-06-25 VITALS
BODY MASS INDEX: 25.21 KG/M2 | RESPIRATION RATE: 16 BRPM | WEIGHT: 137 LBS | HEIGHT: 62 IN | DIASTOLIC BLOOD PRESSURE: 77 MMHG | SYSTOLIC BLOOD PRESSURE: 128 MMHG | HEART RATE: 91 BPM | OXYGEN SATURATION: 99 % | TEMPERATURE: 98.1 F

## 2024-06-25 LAB
A1 MICROGLOB PLACENTAL VAG QL: NEGATIVE
BACTERIA UR QL AUTO: ABNORMAL /HPF
BILIRUB UR QL STRIP: NEGATIVE
CLARITY UR: CLEAR
COLOR UR: YELLOW
GLUCOSE UR STRIP-MCNC: NEGATIVE MG/DL
HGB UR QL STRIP.AUTO: NEGATIVE
HYALINE CASTS UR QL AUTO: ABNORMAL /LPF
KETONES UR QL STRIP: NEGATIVE
LEUKOCYTE ESTERASE UR QL STRIP.AUTO: ABNORMAL
NITRITE UR QL STRIP: NEGATIVE
PH UR STRIP.AUTO: 8.5 [PH] (ref 5–8)
PROT UR QL STRIP: NEGATIVE
RBC # UR STRIP: ABNORMAL /HPF
REF LAB TEST METHOD: ABNORMAL
SP GR UR STRIP: 1.01 (ref 1–1.03)
SQUAMOUS #/AREA URNS HPF: ABNORMAL /HPF
UROBILINOGEN UR QL STRIP: ABNORMAL
WBC # UR STRIP: ABNORMAL /HPF

## 2024-06-25 PROCEDURE — 84112 EVAL AMNIOTIC FLUID PROTEIN: CPT | Performed by: OBSTETRICS & GYNECOLOGY

## 2024-06-25 PROCEDURE — 81001 URINALYSIS AUTO W/SCOPE: CPT | Performed by: OBSTETRICS & GYNECOLOGY

## 2024-06-25 PROCEDURE — 99283 EMERGENCY DEPT VISIT LOW MDM: CPT | Performed by: OBSTETRICS & GYNECOLOGY

## 2024-06-25 PROCEDURE — 87086 URINE CULTURE/COLONY COUNT: CPT | Performed by: OBSTETRICS & GYNECOLOGY

## 2024-06-25 NOTE — NURSING NOTE
Verbal and written instructions given to patient, patient verbalized understanding and denies questions or concerns at this time. Pt ambulatory from ob unit without complaints or difficulty.

## 2024-06-25 NOTE — OBED NOTES
[facility] Note [facility] [place]    Patient Name: Eryn Humphrey  YOB: 2005  MRN: [id number]  Admission Date: 2024 12:55 PM  Date of Service: 2024    Chief Complaint: Leaking Fluid (21.1 lof at 0000.)        Subjective     Eryn Humphrey is a 19 y.o. female  at 21w1d with Estimated Date of Delivery: 24 who presents with the chief complaint listed above.  Patient reports that about noon she experienced a gush of vaginal discharge after she stood up from urinating she sat back down on the toilet and she trickled on and off for the next 30 minutes.  Patient does have a history of UTIs but is not having any symptoms of foul-smelling urine, dysuria, or unusual frequency.  Patient also reports increased vaginal discharge with a light yellow color over the last 3 days     She sees Mey Gonzalez* for her prenatal care. Her pregnancy has been complicated by: History of UTI    She describes fetal movement as normal.  She is concerned about rupture of membranes.  She denies vaginal bleeding. She is not feeling contractions.        Objective   Patient Active Problem List    Diagnosis     Alpha thalassemia silent carrier [D56.3]         OB History    Para Term  AB Living   1 0 0 0 0 0   SAB IAB Ectopic Molar Multiple Live Births   0 0 0 0 0 0      # Outcome Date GA Lbr Tod/2nd Weight Sex Type Anes PTL Lv   1 Current                 Past Medical History:   Diagnosis Date    Depression 2018       History reviewed. No pertinent surgical history.    No current facility-administered medications on file prior to encounter.     Current Outpatient Medications on File Prior to Encounter   Medication Sig Dispense Refill    aspirin 81 MG EC tablet Take 1 tablet by mouth Daily.      Prenatal Vit-Fe Fumarate-FA (prenatal vitamin 27-0.8) 27-0.8 MG tablet tablet Take  by mouth Daily.      ondansetron (Zofran) 4 MG tablet Take 1 tablet by mouth Every 8 (Eight) Hours As Needed for Nausea or Vomiting.  "(Patient not taking: Reported on 6/13/2024) 30 tablet 1    promethazine (PHENERGAN) 12.5 MG tablet Take 1 tablet by mouth Every 6 (Six) Hours As Needed for Nausea or Vomiting. (Patient not taking: Reported on 6/13/2024) 30 tablet 1       No Known Allergies    Family History   Problem Relation Age of Onset    Diabetes Maternal Grandmother     Hypertension Maternal Grandmother     Breast cancer Paternal Grandmother        Social History     Socioeconomic History    Marital status:    Tobacco Use    Smoking status: Never     Passive exposure: Never    Smokeless tobacco: Never   Vaping Use    Vaping status: Never Used   Substance and Sexual Activity    Alcohol use: Never    Drug use: Never    Sexual activity: Yes     Partners: Male     Birth control/protection: None           Review of Systems   Constitutional:  Negative for chills and fever.   HENT: Negative.     Eyes:  Negative for photophobia and visual disturbance.   Respiratory:  Negative for shortness of breath.    Cardiovascular:  Negative for chest pain.   Gastrointestinal:  Negative for nausea.   Genitourinary:  Positive for vaginal discharge.   Psychiatric/Behavioral:  The patient is not nervous/anxious.           PHYSICAL EXAM:      VITAL SIGNS:  Vitals:    06/25/24 1304   BP: 128/77   BP Location: Left arm   Patient Position: Lying   Pulse: 91   Resp: 16   Temp: 98.1 °F (36.7 °C)   TempSrc: Oral   SpO2: 99%   Weight: 62.1 kg (137 lb)   Height: 157.5 cm (62\")            FHT'S:                       Baseline:                             Beats/min:       Fetal HR (beats/min): 140                                             PHYSICAL EXAM:      General: well developed; well nourished  no acute distress   Heart: Not performed.   Lungs   breathing is unlabored   Abdomen: Gravid and non tender     Extremities: trace edema, DTRs 1 plus, no clonus       Cervix: Per RN        Contractions:   None                    LABS AND TESTING ORDERED:  Uterine and fetal " "monitoring  Urinalysis  Cervical exam, ROM plus    LAB RESULTS:    Recent Results (from the past 24 hour(s))   Urinalysis With Culture If Indicated - Urine, Clean Catch    Collection Time: 06/25/24  1:11 PM    Specimen: Urine, Clean Catch   Result Value Ref Range    Color, UA Yellow Yellow, Straw    Appearance, UA Clear Clear    pH, UA 8.5 (H) 5.0 - 8.0    Specific Gravity, UA 1.013 1.005 - 1.030    Glucose, UA Negative Negative    Ketones, UA Negative Negative    Bilirubin, UA Negative Negative    Blood, UA Negative Negative    Protein, UA Negative Negative    Leuk Esterase, UA Moderate (2+) (A) Negative    Nitrite, UA Negative Negative    Urobilinogen, UA 0.2 E.U./dL 0.2 - 1.0 E.U./dL   Urinalysis, Microscopic Only - Urine, Clean Catch    Collection Time: 06/25/24  1:11 PM    Specimen: Urine, Clean Catch   Result Value Ref Range    RBC, UA 0-2 None Seen, 0-2 /HPF    WBC, UA 11-20 (A) None Seen, 0-2 /HPF    Bacteria, UA Trace (A) None Seen /HPF    Squamous Epithelial Cells, UA 3-6 (A) None Seen, 0-2 /HPF    Hyaline Casts, UA 7-12 None Seen /LPF    Methodology Automated Microscopy        Lab Results   Component Value Date    ABO A 03/18/2024    RH Positive 03/18/2024       No results found for: \"STREPGPB\"              External Prenatal Results       Pregnancy Outside Results - Transcribed From Office Records - See Scanned Records For Details       Test Value Date Time    ABO  A  03/18/24 0957    Rh  Positive  03/18/24 0957    Antibody Screen  Negative  03/18/24 0957    Varicella IgG       Rubella  16.40 index 03/18/24 0957    Hgb  13.5 g/dL 04/04/24 2016       13.7 g/dL 03/18/24 0957    Hct  40.9 % 04/04/24 2016       42.2 % 03/18/24 0957    HgB A1c        1h GTT       3h GTT Fasting       3h GTT 1 hour       3h GTT 2 hour       3h GTT 3 hour        Gonorrhea (discrete)  Negative  03/14/24 1703    Chlamydia (discrete)  Negative  03/14/24 1703    RPR  Non-Reactive  03/18/24 0957    Syphilis Antibody       HBsAg  " Non-Reactive  24 0957    Herpes Simplex Virus PCR       Herpes Simplex VIrus Culture       HIV  Non-Reactive  24 0957    Hep C RNA Quant PCR       Hep C Antibody  Non-Reactive  24 0957    AFP  65.2 ng/mL 24 1549    NIPT       Cystic Fibroisis        Group B Strep       GBS Susceptibility to Clindamycin       GBS Susceptibility to Erythromycin       Fetal Fibronectin       Genetic Testing, Maternal Blood                 Drug Screening       Test Value Date Time    Urine Drug Screen       Amphetamine Screen       Barbiturate Screen       Benzodiazepine Screen       Methadone Screen       Phencyclidine Screen       Opiates Screen       THC Screen       Cocaine Screen       Propoxyphene Screen       Buprenorphine Screen       Methamphetamine Screen       Oxycodone Screen       Tricyclic Antidepressants Screen                 Legend    ^: Historical                              Impression:   @ 21w1d .  Final Diagnosis: Rule out rupture of membrane, OB exam benign    Plan:  1. Discharge to home.    2. Plan of care has been reviewed with patient along with risks, benefits of treatment.   All questions have been answered. Call health care provider for any further concerns and keep office appointments.  3.  If vaginal discharge continues to be heavy and is a concern for patient she is to follow-up with her primary OB, comfort measures,  labor precautions      I discussed the patients findings and my recommendations with patient, family, and nursing staff      Zaina Mcadams MD  2024  13:45 EDT

## 2024-06-26 LAB — BACTERIA SPEC AEROBE CULT: NORMAL

## 2024-07-11 ENCOUNTER — ROUTINE PRENATAL (OUTPATIENT)
Dept: OBSTETRICS AND GYNECOLOGY | Age: 19
End: 2024-07-11
Payer: MEDICAID

## 2024-07-11 VITALS — SYSTOLIC BLOOD PRESSURE: 108 MMHG | DIASTOLIC BLOOD PRESSURE: 68 MMHG | BODY MASS INDEX: 25.97 KG/M2 | WEIGHT: 142 LBS

## 2024-07-11 DIAGNOSIS — Z34.92 PRENATAL CARE, SECOND TRIMESTER: ICD-10-CM

## 2024-07-11 DIAGNOSIS — Z3A.23 23 WEEKS GESTATION OF PREGNANCY: Primary | ICD-10-CM

## 2024-07-11 LAB
GLUCOSE UR STRIP-MCNC: NEGATIVE MG/DL
PROT UR STRIP-MCNC: NEGATIVE MG/DL

## 2024-07-11 NOTE — PROGRESS NOTES
Patient feels well today  No complaints  Follow-up 4 weeks for 1 hour glucose challenge test    Chief Complaint   Patient presents with    Routine Prenatal Visit     Ob Check - Pt is 23w3d, Pt has no complaints today       HPI:  Pt presents for routine prenatal visit    ROS:  No fever or chills, no nausea or vomiting, no contractions, no leg pain, no LE edema, no leaking fluid, no bleeding, no headache, no dysuria  All other systems reviewed and negative    Exam:  See flow sheet  General:  Alert and oriented and no distress  Neck: no lymphadenopathy or thyromegaly  Lungs: non - labored breathing  Abd:  See flow sheet, fundus nontender  Ext: see flow sheet, non-tender bilateral , no lesions  Neuro: grossly normal    Assessment:/ PLAN:    Diagnoses and all orders for this visit:    1. 23 weeks gestation of pregnancy (Primary)  -     POC Urinalysis Dipstick    2. Prenatal care, second trimester

## 2024-08-09 ENCOUNTER — ROUTINE PRENATAL (OUTPATIENT)
Dept: OBSTETRICS AND GYNECOLOGY | Age: 19
End: 2024-08-09
Payer: COMMERCIAL

## 2024-08-09 VITALS — BODY MASS INDEX: 27.62 KG/M2 | DIASTOLIC BLOOD PRESSURE: 64 MMHG | WEIGHT: 151 LBS | SYSTOLIC BLOOD PRESSURE: 106 MMHG

## 2024-08-09 DIAGNOSIS — O26.892 VAGINAL DISCHARGE DURING PREGNANCY IN SECOND TRIMESTER: ICD-10-CM

## 2024-08-09 DIAGNOSIS — Z34.92 PRENATAL CARE, SECOND TRIMESTER: ICD-10-CM

## 2024-08-09 DIAGNOSIS — Z3A.27 27 WEEKS GESTATION OF PREGNANCY: Primary | ICD-10-CM

## 2024-08-09 DIAGNOSIS — Z13.1 SCREENING FOR DIABETES MELLITUS: ICD-10-CM

## 2024-08-09 DIAGNOSIS — N89.8 VAGINAL DISCHARGE DURING PREGNANCY IN SECOND TRIMESTER: ICD-10-CM

## 2024-08-09 DIAGNOSIS — Z13.0 SCREENING FOR IRON DEFICIENCY ANEMIA: ICD-10-CM

## 2024-08-09 LAB
GLUCOSE UR STRIP-MCNC: NEGATIVE MG/DL
PROT UR STRIP-MCNC: NEGATIVE MG/DL

## 2024-08-09 NOTE — PROGRESS NOTES
Patient is having some vaginal discharge with an odor  She does not have any itching  She also has a hemorrhoid that she just feels is there  No itching or irritation  Good fetal movement  She is doing her 1 hour glucose challenge test today  Growth scan at follow-up  On exam the discharge appears as normal pregnancy discharge but a swab was done for vaginitis  Hemorrhoid was very tiny and we discussed over-the-counter medications for this if needed    Chief Complaint   Patient presents with    Routine Prenatal Visit     Ob Check - Pt is 27w4d, 1 hr gtt today, Pt c/o vaginal discharge with odor, pt also stating she has a hemorrhoid & would like to discuss this today       HPI:  Pt presents for routine prenatal visit    ROS:  No fever or chills, no nausea or vomiting, no contractions, no leg pain, no LE edema, no leaking fluid, no bleeding, no headache, no dysuria  All other systems reviewed and negative    Exam:  See flow sheet  General:  Alert and oriented and no distress  Neck: no lymphadenopathy or thyromegaly  Lungs: non - labored breathing  Abd:  See flow sheet, fundus nontender  Ext: see flow sheet, non-tender bilateral , no lesions  Neuro: grossly normal    Assessment:/ PLAN:    Diagnoses and all orders for this visit:    1. 27 weeks gestation of pregnancy (Primary)  -     Cancel: POC Urinalysis Dipstick  -     RPR Qualitative with Reflex to Quant  -     POC Urinalysis Dipstick    2. Screening for iron deficiency anemia  -     CBC (No Diff)    3. Screening for diabetes mellitus  -     Gestational Screen 1 Hr ([facility])    4. Prenatal care, second trimester    5. Vaginal discharge during pregnancy in second trimester  -     NuSwab VG+ - Swab, Vagina

## 2024-08-10 LAB
ERYTHROCYTE [DISTWIDTH] IN BLOOD BY AUTOMATED COUNT: 12.1 % (ref 12.3–15.4)
GLUCOSE 1H P 50 G GLC PO SERPL-MCNC: 100 MG/DL (ref 65–139)
HCT VFR BLD AUTO: 37.3 % (ref 34–46.6)
HGB BLD-MCNC: 12.1 G/DL (ref 12–15.9)
MCH RBC QN AUTO: 27.1 PG (ref 26.6–33)
MCHC RBC AUTO-ENTMCNC: 32.4 G/DL (ref 31.5–35.7)
MCV RBC AUTO: 83.6 FL (ref 79–97)
PLATELET # BLD AUTO: 319 10*3/MM3 (ref 140–450)
RBC # BLD AUTO: 4.46 10*6/MM3 (ref 3.77–5.28)
RPR SER QL: NON REACTIVE
WBC # BLD AUTO: 10.93 10*3/MM3 (ref 3.4–10.8)

## 2024-08-12 ENCOUNTER — PATIENT MESSAGE (OUTPATIENT)
Dept: OBSTETRICS AND GYNECOLOGY | Age: 19
End: 2024-08-12
Payer: COMMERCIAL

## 2024-08-12 LAB
A VAGINAE DNA VAG QL NAA+PROBE: NORMAL SCORE
BVAB2 DNA VAG QL NAA+PROBE: NORMAL SCORE
C ALBICANS DNA VAG QL NAA+PROBE: NEGATIVE
C GLABRATA DNA VAG QL NAA+PROBE: NEGATIVE
C TRACH DNA SPEC QL NAA+PROBE: NEGATIVE
MEGA1 DNA VAG QL NAA+PROBE: NORMAL SCORE
N GONORRHOEA DNA VAG QL NAA+PROBE: NEGATIVE
T VAGINALIS DNA VAG QL NAA+PROBE: NEGATIVE

## 2024-08-12 NOTE — TELEPHONE ENCOUNTER
From: Eryn Humphrey  To: Mey Gonzalez  Sent: 8/12/2024 9:18 AM EDT  Subject: Missed call    Hi, I’m currently busy but I saw I had a missed call from the office I was just wondering if it’s because if my paperwork was ready for  or test results

## 2024-08-13 ENCOUNTER — HOSPITAL ENCOUNTER (EMERGENCY)
Facility: HOSPITAL | Age: 19
Discharge: HOME | End: 2024-08-13
Attending: OBSTETRICS & GYNECOLOGY | Admitting: OBSTETRICS & GYNECOLOGY
Payer: COMMERCIAL

## 2024-08-13 VITALS
DIASTOLIC BLOOD PRESSURE: 80 MMHG | OXYGEN SATURATION: 100 % | HEART RATE: 100 BPM | RESPIRATION RATE: 16 BRPM | SYSTOLIC BLOOD PRESSURE: 119 MMHG | TEMPERATURE: 98.2 F

## 2024-08-13 LAB
BACTERIA UR QL AUTO: ABNORMAL /HPF
BILIRUB UR QL STRIP: NEGATIVE
CLARITY UR: ABNORMAL
COLOR UR: YELLOW
GLUCOSE UR STRIP-MCNC: NEGATIVE MG/DL
HGB UR QL STRIP.AUTO: NEGATIVE
HYALINE CASTS UR QL AUTO: ABNORMAL /LPF
KETONES UR QL STRIP: ABNORMAL
LEUKOCYTE ESTERASE UR QL STRIP.AUTO: ABNORMAL
NITRITE UR QL STRIP: NEGATIVE
PH UR STRIP.AUTO: 6.5 [PH] (ref 5–8)
PROT UR QL STRIP: ABNORMAL
RBC # UR STRIP: ABNORMAL /HPF
REF LAB TEST METHOD: ABNORMAL
SP GR UR STRIP: 1.02 (ref 1–1.03)
SQUAMOUS #/AREA URNS HPF: ABNORMAL /HPF
UROBILINOGEN UR QL STRIP: ABNORMAL
WBC # UR STRIP: ABNORMAL /HPF

## 2024-08-13 PROCEDURE — 99284 EMERGENCY DEPT VISIT MOD MDM: CPT | Performed by: OBSTETRICS & GYNECOLOGY

## 2024-08-13 PROCEDURE — 81001 URINALYSIS AUTO W/SCOPE: CPT | Performed by: OBSTETRICS & GYNECOLOGY

## 2024-08-13 PROCEDURE — 87086 URINE CULTURE/COLONY COUNT: CPT | Performed by: OBSTETRICS & GYNECOLOGY

## 2024-08-13 PROCEDURE — 59025 FETAL NON-STRESS TEST: CPT

## 2024-08-13 NOTE — TELEPHONE ENCOUNTER
I phoned pt after sending [employer] message to discuss her concerns. Pt stating she has not felt any fetal movement x's 4 hours. Pt also stating she has had spontaneous abdominal cramping since yesterday. Pt denied vaginal bleeding or fluid loss. I advised pt to L&D for evaluation. L&D notified.

## 2024-08-13 NOTE — OBED NOTES
[facility] Note OB    Patient Name: Eryn Humphrey  YOB: 2005  MRN: [id number]  Admission Date: 2024  5:18 PM  Date of Service: 2024    Chief Complaint: Decreased fetal movement        Subjective     Eryn Humphrey is a 19 y.o. female  at 28w1d with Estimated Date of Delivery: 24 who presents with the chief complaint listed above.  Patient reports that she has had decreased fetal movement for the last day.  Audible fetal movement could be heard on the Doppler at bedside     She sees Mey Gonzalez* for her prenatal care. Her pregnancy has been complicated by: UTI, history of depression ,history of vaginal discharge    She describes fetal movement as decreased.  She denies rupture of membranes.  She denies vaginal bleeding. She is not feeling contractions.        Objective   Patient Active Problem List    Diagnosis     Alpha thalassemia silent carrier [D56.3]         OB History    Para Term  AB Living   1 0 0 0 0 0   SAB IAB Ectopic Molar Multiple Live Births   0 0 0 0 0 0      # Outcome Date GA Lbr Tod/2nd Weight Sex Type Anes PTL Lv   1 Current                 Past Medical History:   Diagnosis Date    Depression 2018       No past surgical history on file.    No current facility-administered medications on file prior to encounter.     Current Outpatient Medications on File Prior to Encounter   Medication Sig Dispense Refill    aspirin 81 MG EC tablet Take 1 tablet by mouth Daily.      ondansetron (Zofran) 4 MG tablet Take 1 tablet by mouth Every 8 (Eight) Hours As Needed for Nausea or Vomiting. (Patient not taking: Reported on 2024) 30 tablet 1    Prenatal Vit-Fe Fumarate-FA (prenatal vitamin 27-0.8) 27-0.8 MG tablet tablet Take  by mouth Daily.      promethazine (PHENERGAN) 12.5 MG tablet Take 1 tablet by mouth Every 6 (Six) Hours As Needed for Nausea or Vomiting. (Patient not taking: Reported on 2024) 30 tablet 1       No Known  "Allergies    Family History   Problem Relation Age of Onset    Diabetes Maternal Grandmother     Hypertension Maternal Grandmother     Breast cancer Paternal Grandmother        Social History     Socioeconomic History    Marital status:    Tobacco Use    Smoking status: Never     Passive exposure: Never    Smokeless tobacco: Never   Vaping Use    Vaping status: Never Used   Substance and Sexual Activity    Alcohol use: Never    Drug use: Never    Sexual activity: Yes     Partners: Male     Birth control/protection: None           Review of Systems   Constitutional:  Negative for chills and fever.   HENT: Negative.     Eyes:  Negative for photophobia and visual disturbance.   Respiratory:  Negative for shortness of breath.    Cardiovascular:  Negative for chest pain.   Gastrointestinal:  Negative for nausea.   Psychiatric/Behavioral:  The patient is not nervous/anxious.           PHYSICAL EXAM:      VITAL SIGNS:  There were no vitals filed for this visit.         FHT'S:   130 with moderate variability                                     PHYSICAL EXAM:      General: well developed; well nourished  no acute distress   Heart: Not performed.   Lungs   breathing is unlabored   Abdomen: Gravid and non tender     Extremities: trace edema, DTRs 1 plus, no clonus       Cervix: was not checked.        Contractions:   none                    LABS AND TESTING ORDERED:  Uterine and fetal monitoring  Urinalysis      LAB RESULTS:    No results found for this or any previous visit (from the past 24 hour(s)).    Lab Results   Component Value Date    ABO A 03/18/2024    RH Positive 03/18/2024       No results found for: \"STREPGPB\"              External Prenatal Results       Pregnancy Outside Results - Transcribed From Office Records - See Scanned Records For Details       Test Value Date Time    ABO  A  03/18/24 0957    Rh  Positive  03/18/24 0957    Antibody Screen  Negative  03/18/24 0957    Varicella IgG       Rubella  " 16.40 index 24 0957    Hgb  12.1 g/dL 24 1145       13.5 g/dL 24       13.7 g/dL 24 0957    Hct  37.3 % 24 1145       40.9 % 24       42.2 % 24 0957    HgB A1c        1h GTT  100 mg/dL 24 1145    3h GTT Fasting       3h GTT 1 hour       3h GTT 2 hour       3h GTT 3 hour        Gonorrhea (discrete)  Negative  24 1102       Negative  24 1703    Chlamydia (discrete)  Negative  24 1102       Negative  24 1703    RPR  Non Reactive  24 1145       Non-Reactive  24 0957    Syphils cascade: TP-Ab (FTA)       TP-Ab       TP-Ab (EIA)       TPPA       HBsAg  Non-Reactive  24 0957    Herpes Simplex Virus PCR       Herpes Simplex VIrus Culture       HIV  Non-Reactive  24 0957    Hep C RNA Quant PCR       Hep C Antibody  Non-Reactive  24 0957    AFP  65.2 ng/mL 24 1549    NIPT       Cystic Fibroisis        Group B Strep       GBS Susceptibility to Clindamycin       GBS Susceptibility to Erythromycin       Fetal Fibronectin       Genetic Testing, Maternal Blood                 Drug Screening       Test Value Date Time    Urine Drug Screen       Amphetamine Screen       Barbiturate Screen       Benzodiazepine Screen       Methadone Screen       Phencyclidine Screen       Opiates Screen       THC Screen       Cocaine Screen       Propoxyphene Screen       Buprenorphine Screen       Methamphetamine Screen       Oxycodone Screen       Tricyclic Antidepressants Screen                 Legend    ^: Historical                              Impression:   @ 28w1d .  Final Diagnosis: Report decreased fetal movement, tracing reassuring    Plan:  1. Discharge to home.    2. Plan of care has been reviewed with patient along with risks, benefits of treatment.   All questions have been answered. Call health care provider for any further concerns and keep office appointments.  3.  Comfort measures, fetal kick counts, PTL  precautions      I discussed the patients findings and my recommendations with patient, family, and nursing staff      Zaina Mcadams MD  8/13/2024  17:31 EDT

## 2024-08-14 NOTE — TELEPHONE ENCOUNTER
Called PT. No answer. Left v/cmail. Will call again at later time.    Pt was seen in L&D last night. Urine culture pending. Please advise.

## 2024-08-15 LAB — BACTERIA SPEC AEROBE CULT: NO GROWTH

## 2024-08-15 NOTE — PROGRESS NOTES
Phoned pt & lvm of normal results. Pt also viewed results on [employer]. Advised pt to contact our office with any questions or concerns.

## 2024-09-04 ENCOUNTER — ROUTINE PRENATAL (OUTPATIENT)
Dept: OBSTETRICS AND GYNECOLOGY | Age: 19
End: 2024-09-04
Payer: COMMERCIAL

## 2024-09-04 VITALS — SYSTOLIC BLOOD PRESSURE: 120 MMHG | DIASTOLIC BLOOD PRESSURE: 78 MMHG | BODY MASS INDEX: 29.23 KG/M2 | WEIGHT: 159.8 LBS

## 2024-09-04 DIAGNOSIS — O26.899 VAGINAL DISCHARGE DURING PREGNANCY, ANTEPARTUM: ICD-10-CM

## 2024-09-04 DIAGNOSIS — Z3A.31 31 WEEKS GESTATION OF PREGNANCY: Primary | ICD-10-CM

## 2024-09-04 DIAGNOSIS — N89.8 VAGINAL ODOR: ICD-10-CM

## 2024-09-04 DIAGNOSIS — N89.8 VAGINAL DISCHARGE DURING PREGNANCY, ANTEPARTUM: ICD-10-CM

## 2024-09-04 DIAGNOSIS — D56.3 ALPHA THALASSEMIA SILENT CARRIER: ICD-10-CM

## 2024-09-04 LAB
GLUCOSE UR STRIP-MCNC: NEGATIVE MG/DL
PROT UR STRIP-MCNC: NEGATIVE MG/DL

## 2024-09-04 NOTE — PROGRESS NOTES
Chief Complaint   Patient presents with    Routine Prenatal Visit     OB Check, Pt is 31w2d, Growth U/S today, Pt C/O painful IC        HPI: 19 y.o.  at 31w2d no lof,   Good fm   Has some pain with intercourse  Has vaginal discharge and odor  Reports this is the same as she had last month and had a swab that was normal    Relevant data reviewed:NuSwab VG+ - Swab, Vagina (2024 11:02)     Last OB US growth (since 2024)         Value Time User    EFW%ILE   88.5%ile 2024  3:32 PM Elvi Saleh APRN    AC%ILE   63.4%ile 2024  3:32 PM Elvi Saleh APRN          Vitals:    24 1435   BP: 120/78   Weight: 72.5 kg (159 lb 12.8 oz)     Total weight gain for pregnancy:  Not found.    Cx exam:   / /        Review of systems:   Gen: negative  CV:     negative  GI: negative  :   good fetal movement noted  and vaginal discharge  MS:    negative  Neuro: negative and denies headaches and visual changes   Pul: negative    Physical Exam  Constitutional:       General: She is not in acute distress.  Pulmonary:      Effort: Pulmonary effort is normal.   Abdominal:      Palpations: Abdomen is soft.      Tenderness: There is no abdominal tenderness.   Genitourinary:     Vagina: Vaginal discharge present.      Comments: Thick white discharge creamy  Skin:     General: Skin is warm.   Neurological:      Mental Status: She is alert.   Psychiatric:         Mood and Affect: Mood normal.         Thought Content: Thought content normal.         Judgment: Judgment normal.       Impression:  Intrauterine pregnancy at 31w2d  Placental location: Posterior  Lower uterine segment: no previa   Normal Interval growth  EFW: 88.5%ile, AC: 63.4%ile  Estimated fetal weight:  1867 g  Fetal position: Vertex  PILI: 20 cm  DVP: 7.6 cm  Fetal heart rate: 133  Good fetal movement and breathing seen    A/P  1. Intrauterine pregnancy at 31w2d       Diagnoses and all orders for this visit:    1. 31 weeks gestation of  pregnancy (Primary)  -     POC Urinalysis Dipstick    2. Vaginal odor  -     NuSwab VG+ - Swab, Vagina    3. Vaginal discharge during pregnancy, antepartum  -     NuSwab VG+ - Swab, Vagina         labor was discussed.  Warnings were provided.  Nutrition and weight gain were addressed.  -----------------------  PLAN: Discussed changes with pregnancy and hormones will check NuSwab to rule out infection  PTB discussed    Return in about 2 weeks (around 2024).    Elvi Saleh, APRN  2024 15:33 EDT

## 2024-09-17 ENCOUNTER — ROUTINE PRENATAL (OUTPATIENT)
Dept: OBSTETRICS AND GYNECOLOGY | Age: 19
End: 2024-09-17
Payer: COMMERCIAL

## 2024-09-17 VITALS — WEIGHT: 161 LBS | BODY MASS INDEX: 29.45 KG/M2 | DIASTOLIC BLOOD PRESSURE: 68 MMHG | SYSTOLIC BLOOD PRESSURE: 112 MMHG

## 2024-09-17 DIAGNOSIS — Z34.93 PRENATAL CARE, THIRD TRIMESTER: ICD-10-CM

## 2024-09-17 DIAGNOSIS — Z3A.33 33 WEEKS GESTATION OF PREGNANCY: Primary | ICD-10-CM

## 2024-09-17 LAB
GLUCOSE UR STRIP-MCNC: NEGATIVE MG/DL
PROT UR STRIP-MCNC: NEGATIVE MG/DL

## 2024-09-17 PROCEDURE — 90715 TDAP VACCINE 7 YRS/> IM: CPT | Performed by: OBSTETRICS & GYNECOLOGY

## 2024-09-17 PROCEDURE — 90471 IMMUNIZATION ADMIN: CPT | Performed by: OBSTETRICS & GYNECOLOGY

## 2024-09-17 PROCEDURE — 0502F SUBSEQUENT PRENATAL CARE: CPT | Performed by: OBSTETRICS & GYNECOLOGY

## 2024-09-29 ENCOUNTER — HOSPITAL ENCOUNTER (EMERGENCY)
Facility: HOSPITAL | Age: 19
Discharge: HOME | End: 2024-09-29
Attending: OBSTETRICS & GYNECOLOGY | Admitting: OBSTETRICS & GYNECOLOGY
Payer: MEDICAID

## 2024-09-29 VITALS
RESPIRATION RATE: 16 BRPM | WEIGHT: 164.6 LBS | TEMPERATURE: 98.3 F | SYSTOLIC BLOOD PRESSURE: 126 MMHG | BODY MASS INDEX: 30.11 KG/M2 | OXYGEN SATURATION: 98 % | DIASTOLIC BLOOD PRESSURE: 81 MMHG | HEART RATE: 98 BPM

## 2024-09-29 LAB
A1 MICROGLOB PLACENTAL VAG QL: NEGATIVE
BACTERIA UR QL AUTO: ABNORMAL /HPF
BILIRUB UR QL STRIP: NEGATIVE
CLARITY UR: CLEAR
COLOR UR: YELLOW
GLUCOSE UR STRIP-MCNC: NEGATIVE MG/DL
HGB UR QL STRIP.AUTO: NEGATIVE
HYALINE CASTS UR QL AUTO: ABNORMAL /LPF
KETONES UR QL STRIP: NEGATIVE
LEUKOCYTE ESTERASE UR QL STRIP.AUTO: ABNORMAL
NITRITE UR QL STRIP: NEGATIVE
PH UR STRIP.AUTO: 7.5 [PH] (ref 5–8)
PROT UR QL STRIP: NEGATIVE
RBC # UR STRIP: ABNORMAL /HPF
REF LAB TEST METHOD: ABNORMAL
SP GR UR STRIP: 1.01 (ref 1–1.03)
SQUAMOUS #/AREA URNS HPF: ABNORMAL /HPF
UROBILINOGEN UR QL STRIP: ABNORMAL
WBC # UR STRIP: ABNORMAL /HPF

## 2024-09-29 PROCEDURE — 99284 EMERGENCY DEPT VISIT MOD MDM: CPT | Performed by: OBSTETRICS & GYNECOLOGY

## 2024-09-29 PROCEDURE — 87086 URINE CULTURE/COLONY COUNT: CPT | Performed by: OBSTETRICS & GYNECOLOGY

## 2024-09-29 PROCEDURE — 59020 FETAL CONTRACT STRESS TEST: CPT

## 2024-09-29 PROCEDURE — 84112 EVAL AMNIOTIC FLUID PROTEIN: CPT | Performed by: OBSTETRICS & GYNECOLOGY

## 2024-09-29 PROCEDURE — 81001 URINALYSIS AUTO W/SCOPE: CPT | Performed by: OBSTETRICS & GYNECOLOGY

## 2024-09-29 PROCEDURE — 59025 FETAL NON-STRESS TEST: CPT | Performed by: OBSTETRICS & GYNECOLOGY

## 2024-09-30 NOTE — OBED NOTES
[facility] Note OB    Patient Name: Eryn Humphrey  YOB: 2005  MRN: [id number]  Admission Date: 2024  8:28 PM  Date of Service: 2024    Chief Complaint: Contractions ( 34.6 weeks presents with r/o srom at 2015. Pt noticed a gush of clear fluid. NO leaking since, Reports +FM, denies any bleeding, reports some irregular ctx rating 4/10)        Subjective     Eryn Humphrey is a 19 y.o. female  at 34w6d with Estimated Date of Delivery: 24 who presents with the chief complaint listed above.  Patient reports she is having some mild cramping but is not concerned with being in labor.     She sees Mey Gonzalez* for her prenatal care. Her pregnancy has been complicated by: History of UTI, depression, vaginal discharge    She describes fetal movement as normal.  She not sure if she has rupture of membranes.  She denies vaginal bleeding. She is feeling contractions.        Objective   Patient Active Problem List    Diagnosis     Prenatal care, third trimester [Z34.93]     Alpha thalassemia silent carrier [D56.3]         OB History    Para Term  AB Living   1 0 0 0 0 0   SAB IAB Ectopic Molar Multiple Live Births   0 0 0 0 0 0      # Outcome Date GA Lbr Tod/2nd Weight Sex Type Anes PTL Lv   1 Current                 Past Medical History:   Diagnosis Date    Depression 2018       History reviewed. No pertinent surgical history.    No current facility-administered medications on file prior to encounter.     Current Outpatient Medications on File Prior to Encounter   Medication Sig Dispense Refill    aspirin 81 MG EC tablet Take 1 tablet by mouth Daily.      Prenatal Vit-Fe Fumarate-FA (prenatal vitamin 27-0.8) 27-0.8 MG tablet tablet Take  by mouth Daily.      promethazine (PHENERGAN) 12.5 MG tablet Take 1 tablet by mouth Every 6 (Six) Hours As Needed for Nausea or Vomiting. (Patient not taking: Reported on 2024) 30 tablet 1    [DISCONTINUED] ondansetron  (Zofran) 4 MG tablet Take 1 tablet by mouth Every 8 (Eight) Hours As Needed for Nausea or Vomiting. 30 tablet 1       No Known Allergies    Family History   Problem Relation Age of Onset    Diabetes Maternal Grandmother     Hypertension Maternal Grandmother     Breast cancer Paternal Grandmother        Social History     Socioeconomic History    Marital status:    Tobacco Use    Smoking status: Never     Passive exposure: Never    Smokeless tobacco: Never   Vaping Use    Vaping status: Never Used   Substance and Sexual Activity    Alcohol use: Never    Drug use: Never    Sexual activity: Yes     Partners: Male     Birth control/protection: None           Review of Systems   Constitutional:  Negative for chills and fever.   HENT: Negative.     Eyes:  Negative for photophobia and visual disturbance.   Respiratory:  Negative for shortness of breath.    Cardiovascular:  Negative for chest pain.   Gastrointestinal:  Positive for abdominal pain. Negative for nausea.   Genitourinary:  Positive for vaginal discharge.   Psychiatric/Behavioral:  The patient is not nervous/anxious.           PHYSICAL EXAM:      VITAL SIGNS:  Vitals:    09/29/24 2030 09/29/24 2041   BP:  126/81   BP Location:  Right arm   Patient Position:  Lying   Pulse:  98   Resp:  16   Temp:  98.3 °F (36.8 °C)   TempSrc:  Oral   SpO2:  98%   Weight: 74.7 kg (164 lb 9.6 oz)             FHT'S:                       Baseline:         Fetal HR Baseline: normal range                   Beats/min:       Fetal HR (beats/min): 145        Variability:        Fetal HR Variability: moderate (amplitude range 6 to 25 bpm)  Accelerations:  Fetal HR Accelerations: greater than/equal to 15 bpm, lasting at least 15 seconds  Decelerations:  Fetal HR Decelerations: absent      Interpretation:  reassuring and category 1                                     PHYSICAL EXAM:      General: well developed; well nourished  no acute distress  mentation appropriate   Heart: Not  "performed.   Lungs   breathing is unlabored   Abdomen: Gravid and non tender     Extremities: trace edema, DTRs 1 plus, no clonus       Cervix: Per RN  Cervical Dilation (cm): 0  Cervical Effacement: 30%  Fetal Station: -3     Contractions:   irregular                    LABS AND TESTING ORDERED:  Uterine and fetal monitoring  Urinalysis  ROM plus    LAB RESULTS:    Recent Results (from the past 24 hour(s))   Urinalysis With Culture If Indicated - Urine, Clean Catch    Collection Time: 09/29/24  8:47 PM    Specimen: Urine, Clean Catch   Result Value Ref Range    Color, UA Yellow Yellow, Straw    Appearance, UA Clear Clear    pH, UA 7.5 5.0 - 8.0    Specific Gravity, UA 1.008 1.005 - 1.030    Glucose, UA Negative Negative    Ketones, UA Negative Negative    Bilirubin, UA Negative Negative    Blood, UA Negative Negative    Protein, UA Negative Negative    Leuk Esterase, UA Large (3+) (A) Negative    Nitrite, UA Negative Negative    Urobilinogen, UA 0.2 E.U./dL 0.2 - 1.0 E.U./dL   Rapid Assay For ROM - Amniotic Fluid, Amniotic Sac    Collection Time: 09/29/24  8:47 PM    Specimen: Amniotic Sac; Amniotic Fluid   Result Value Ref Range    Rupture of Membranes Negative Negative   Urinalysis, Microscopic Only - Urine, Clean Catch    Collection Time: 09/29/24  8:47 PM    Specimen: Urine, Clean Catch   Result Value Ref Range    RBC, UA None Seen None Seen, 0-2 /HPF    WBC, UA 11-20 (A) None Seen, 0-2 /HPF    Bacteria, UA None Seen None Seen /HPF    Squamous Epithelial Cells, UA 3-6 (A) None Seen, 0-2 /HPF    Hyaline Casts, UA None Seen None Seen /LPF    Methodology Automated Microscopy        Lab Results   Component Value Date    ABO A 03/18/2024    RH Positive 03/18/2024       No results found for: \"STREPGPB\"              External Prenatal Results       Pregnancy Outside Results - Transcribed From Office Records - See Scanned Records For Details       Test Value Date Time    ABO  A  03/18/24 0957    Rh  Positive  03/18/24 " 0957    Antibody Screen  Negative  24 0957    Varicella IgG       Rubella  16.40 index 24 0957    Hgb  12.1 g/dL 24 1145       13.5 g/dL 24       13.7 g/dL 24 0957    Hct  37.3 % 24 1145       40.9 % 24       42.2 % 24 0957    HgB A1c        1h GTT  100 mg/dL 24 1145    3h GTT Fasting       3h GTT 1 hour       3h GTT 2 hour       3h GTT 3 hour        Gonorrhea (discrete)  Negative  24 1535       Negative  24 1102       Negative  24 1703    Chlamydia (discrete)  Negative  24 1535       Negative  24 1102       Negative  24 1703    RPR  Non Reactive  24 1145       Non-Reactive  24 0957    Syphils cascade: TP-Ab (FTA)       TP-Ab       TP-Ab (EIA)       TPPA       HBsAg  Non-Reactive  24 0957    Herpes Simplex Virus PCR       Herpes Simplex VIrus Culture       HIV  Non-Reactive  24 0957    Hep C RNA Quant PCR       Hep C Antibody  Non-Reactive  24 0957    AFP  65.2 ng/mL 24 1549    NIPT       Cystic Fibroisis        Group B Strep       GBS Susceptibility to Clindamycin       GBS Susceptibility to Erythromycin       Fetal Fibronectin       Genetic Testing, Maternal Blood                 Drug Screening       Test Value Date Time    Urine Drug Screen       Amphetamine Screen       Barbiturate Screen       Benzodiazepine Screen       Methadone Screen       Phencyclidine Screen       Opiates Screen       THC Screen       Cocaine Screen       Propoxyphene Screen       Buprenorphine Screen       Methamphetamine Screen       Oxycodone Screen       Tricyclic Antidepressants Screen                 Legend    ^: Historical                              Impression:   @ 34w6d .  Final Diagnosis: Rule out rupture of membrane, OB exam benign    Plan:  1. Discharge to home.    2. Plan of care has been reviewed with patient along with risks, benefits of treatment.   All questions have been answered.  Call health care provider for any further concerns and keep office appointments.  3.  Comfort measures,  labor precautions      I discussed the patients findings and my recommendations with patient, family, and nursing staff      Zaina Mcadams MD  2024  21:21 EDT

## 2024-10-01 ENCOUNTER — ROUTINE PRENATAL (OUTPATIENT)
Dept: OBSTETRICS AND GYNECOLOGY | Age: 19
End: 2024-10-01
Payer: MEDICAID

## 2024-10-01 ENCOUNTER — LAB (OUTPATIENT)
Facility: HOSPITAL | Age: 19
End: 2024-10-01
Payer: MEDICAID

## 2024-10-01 VITALS — WEIGHT: 165 LBS | SYSTOLIC BLOOD PRESSURE: 110 MMHG | DIASTOLIC BLOOD PRESSURE: 64 MMHG | BODY MASS INDEX: 30.18 KG/M2

## 2024-10-01 DIAGNOSIS — Z3A.35 35 WEEKS GESTATION OF PREGNANCY: Primary | ICD-10-CM

## 2024-10-01 DIAGNOSIS — L29.9 PRURITUS: ICD-10-CM

## 2024-10-01 DIAGNOSIS — D56.3 ALPHA THALASSEMIA SILENT CARRIER: ICD-10-CM

## 2024-10-01 LAB
ALBUMIN SERPL-MCNC: 3.5 G/DL (ref 3.5–5.2)
ALBUMIN/GLOB SERPL: 1 G/DL
ALP SERPL-CCNC: 156 U/L (ref 39–117)
ALT SERPL W P-5'-P-CCNC: 12 U/L (ref 1–33)
ANION GAP SERPL CALCULATED.3IONS-SCNC: 11.4 MMOL/L (ref 5–15)
AST SERPL-CCNC: 16 U/L (ref 1–32)
BACTERIA SPEC AEROBE CULT: NO GROWTH
BILE AC SERPL-SCNC: 3 UMOL/L (ref 0–10)
BILIRUB SERPL-MCNC: 0.3 MG/DL (ref 0–1.2)
BUN SERPL-MCNC: 3 MG/DL (ref 6–20)
BUN/CREAT SERPL: 5.4 (ref 7–25)
CALCIUM SPEC-SCNC: 9 MG/DL (ref 8.6–10.5)
CHLORIDE SERPL-SCNC: 103 MMOL/L (ref 98–107)
CO2 SERPL-SCNC: 20.6 MMOL/L (ref 22–29)
CREAT SERPL-MCNC: 0.56 MG/DL (ref 0.57–1)
EGFRCR SERPLBLD CKD-EPI 2021: 133.4 ML/MIN/1.73
GLOBULIN UR ELPH-MCNC: 3.5 GM/DL
GLUCOSE SERPL-MCNC: 92 MG/DL (ref 65–99)
GLUCOSE UR STRIP-MCNC: NEGATIVE MG/DL
POTASSIUM SERPL-SCNC: 3.9 MMOL/L (ref 3.5–5.2)
PROT SERPL-MCNC: 7 G/DL (ref 6–8.5)
PROT UR STRIP-MCNC: NEGATIVE MG/DL
SODIUM SERPL-SCNC: 135 MMOL/L (ref 136–145)

## 2024-10-01 PROCEDURE — 80053 COMPREHEN METABOLIC PANEL: CPT | Performed by: PHYSICIAN ASSISTANT

## 2024-10-01 PROCEDURE — 90471 IMMUNIZATION ADMIN: CPT | Performed by: PHYSICIAN ASSISTANT

## 2024-10-01 PROCEDURE — 36415 COLL VENOUS BLD VENIPUNCTURE: CPT | Performed by: PHYSICIAN ASSISTANT

## 2024-10-01 PROCEDURE — 90678 RSV VACC PREF BIVALENT IM: CPT | Performed by: PHYSICIAN ASSISTANT

## 2024-10-01 PROCEDURE — 99213 OFFICE O/P EST LOW 20 MIN: CPT | Performed by: PHYSICIAN ASSISTANT

## 2024-10-01 PROCEDURE — 90472 IMMUNIZATION ADMIN EACH ADD: CPT | Performed by: PHYSICIAN ASSISTANT

## 2024-10-01 PROCEDURE — 90656 IIV3 VACC NO PRSV 0.5 ML IM: CPT | Performed by: PHYSICIAN ASSISTANT

## 2024-10-01 PROCEDURE — 82239 BILE ACIDS TOTAL: CPT | Performed by: PHYSICIAN ASSISTANT

## 2024-10-01 RX ORDER — FAMOTIDINE 20 MG/1
20 TABLET, FILM COATED ORAL DAILY
Qty: 30 TABLET | Refills: 1 | Status: SHIPPED | OUTPATIENT
Start: 2024-10-01 | End: 2025-10-01

## 2024-10-01 NOTE — PROGRESS NOTES
"Chief Complaint   Patient presents with    Routine Prenatal Visit     35 week ob visit, GBS due today, she will do her RSV today and flu next visit.       HPI: 19 y.o.  at 35w1d gestation  She is here for routine ob visit  Notes good FM  Seen in L&D last week for possible ROM  All checked out ok  Denies unusual d/c today  Is noting heartburn symptoms  Feels like something is \"stuck\" in her chest  Sxs improve with burping or vomiting  Denies elevated HR or difficulty breathing  Will start meds for heartburn  Is noting itching, too  Primarily LE but also soles of feet and palms of hand  Is not taking anything for it  Will check bile acids and CMP today  Start benadryl prn itch   Avoid hot showers  GBS done today  F/u in 1 wk    Vitals:    10/01/24 0939   BP: 110/64   Weight: 74.8 kg (165 lb)       ROS:  GI:  Negative  : na  Pulmonary: Negative     A/P  1. Intrauterine pregnancy at 35w1d   2. Pregnancy Risk:  NORMAL    Diagnoses and all orders for this visit:    1. 35 weeks gestation of pregnancy (Primary)  -     POC Urinalysis Dipstick  -     Group B Streptococcus Culture - Swab, Vaginal/Rectum    2. Alpha thalassemia silent carrier    3. Pruritus  -     Bile Acids, Total  -     Comprehensive Metabolic Panel    Other orders  -     famotidine (Pepcid) 20 MG tablet; Take 1 tablet by mouth Daily.  Dispense: 30 tablet; Refill: 1        -----------------------  PLAN:   Return in about 1 week (around 10/8/2024) for belly check.      GINNA Baum  10/1/2024 10:10 EDT    "

## 2024-10-05 LAB — B-HEM STREP SPEC QL CULT: NEGATIVE

## 2024-10-07 ENCOUNTER — ROUTINE PRENATAL (OUTPATIENT)
Dept: OBSTETRICS AND GYNECOLOGY | Age: 19
End: 2024-10-07
Payer: MEDICAID

## 2024-10-07 VITALS — WEIGHT: 169 LBS | SYSTOLIC BLOOD PRESSURE: 118 MMHG | BODY MASS INDEX: 30.91 KG/M2 | DIASTOLIC BLOOD PRESSURE: 78 MMHG

## 2024-10-07 DIAGNOSIS — D56.3 ALPHA THALASSEMIA SILENT CARRIER: ICD-10-CM

## 2024-10-07 DIAGNOSIS — R12 HEARTBURN: ICD-10-CM

## 2024-10-07 DIAGNOSIS — Z3A.36 36 WEEKS GESTATION OF PREGNANCY: Primary | ICD-10-CM

## 2024-10-07 LAB
GLUCOSE UR STRIP-MCNC: NEGATIVE MG/DL
PROT UR STRIP-MCNC: ABNORMAL MG/DL

## 2024-10-07 PROCEDURE — 99213 OFFICE O/P EST LOW 20 MIN: CPT | Performed by: PHYSICIAN ASSISTANT

## 2024-10-07 NOTE — PROGRESS NOTES
Chief Complaint   Patient presents with    Routine Prenatal Visit     36 week ob visit, has noticed more swelling in feet       HPI: 19 y.o.  at 36w0d gestation  She is doing pretty well  Itching is better with benadryl  Heartburn better with pepcid  Did have a 4 lb weight gain this past week  Notes edema as well  I appreciate no edema today and her BP is wnl. No vision changes or HA either  Overall reassuring findings  Notes good FM  Denies contractions  GBS negative  She did end up receiving flu vaccine last week  Utd on vaccines  Plan f/u in 1 wk  Monitor for [facility]  To L&D for DFM, ROM or bleeding        Vitals:    10/07/24 1129   BP: 118/78   Weight: 76.7 kg (169 lb)       ROS:  GI:  Negative  : na  Pulmonary: Negative     A/P  1. Intrauterine pregnancy at 36w0d   2. Pregnancy Risk:  NORMAL    Diagnoses and all orders for this visit:    1. 36 weeks gestation of pregnancy (Primary)  -     POC Urinalysis Dipstick    2. Alpha thalassemia silent carrier    3. Heartburn        -----------------------  PLAN:   Return in about 1 year (around 10/7/2025).      GINNA Baum  10/7/2024 12:43 EDT

## 2024-10-14 ENCOUNTER — ROUTINE PRENATAL (OUTPATIENT)
Dept: OBSTETRICS AND GYNECOLOGY | Age: 19
End: 2024-10-14
Payer: MEDICAID

## 2024-10-14 VITALS — DIASTOLIC BLOOD PRESSURE: 64 MMHG | WEIGHT: 167 LBS | BODY MASS INDEX: 30.54 KG/M2 | SYSTOLIC BLOOD PRESSURE: 106 MMHG

## 2024-10-14 DIAGNOSIS — Z3A.37 37 WEEKS GESTATION OF PREGNANCY: Primary | ICD-10-CM

## 2024-10-14 DIAGNOSIS — Z34.93 PRENATAL CARE, THIRD TRIMESTER: ICD-10-CM

## 2024-10-14 LAB
GLUCOSE UR STRIP-MCNC: NEGATIVE MG/DL
PROT UR STRIP-MCNC: ABNORMAL MG/DL

## 2024-10-14 PROCEDURE — 99213 OFFICE O/P EST LOW 20 MIN: CPT | Performed by: OBSTETRICS & GYNECOLOGY

## 2024-10-14 NOTE — PROGRESS NOTES
Patient is feeling well  She is having some contractions  Good fetal movement  Cervix is 70/2/-2  Labor warnings and fetal movement counts  Follow-up 1 week    Chief Complaint   Patient presents with    Routine Prenatal Visit     Ob Check - Pt is 37w0d, Pt has no complaints today       HPI:  Pt presents for routine prenatal visit    ROS:  No fever or chills, no nausea or vomiting, no contractions, no leg pain, no LE edema, no leaking fluid, no bleeding, no headache, no dysuria  All other systems reviewed and negative    Exam:  See flow sheet  General:  Alert and oriented and no distress  Neck: no lymphadenopathy or thyromegaly  Lungs: non - labored breathing  Abd:  See flow sheet, fundus nontender  Ext: see flow sheet, non-tender bilateral , no lesions  Neuro: grossly normal    Assessment:/ PLAN:    Diagnoses and all orders for this visit:    1. 37 weeks gestation of pregnancy (Primary)  -     POC Urinalysis Dipstick    2. Prenatal care, third trimester

## 2024-10-19 ENCOUNTER — ANESTHESIA (OUTPATIENT)
Dept: LABOR AND DELIVERY | Facility: HOSPITAL | Age: 19
End: 2024-10-19
Payer: MEDICAID

## 2024-10-19 ENCOUNTER — HOSPITAL ENCOUNTER (INPATIENT)
Facility: HOSPITAL | Age: 19
LOS: 3 days | Discharge: HOME | End: 2024-10-22
Attending: OBSTETRICS & GYNECOLOGY | Admitting: OBSTETRICS & GYNECOLOGY
Payer: MEDICAID

## 2024-10-19 ENCOUNTER — ANESTHESIA EVENT (OUTPATIENT)
Dept: LABOR AND DELIVERY | Facility: HOSPITAL | Age: 19
End: 2024-10-19
Payer: MEDICAID

## 2024-10-19 PROBLEM — Z34.90 PREGNANT: Status: ACTIVE | Noted: 2024-10-19

## 2024-10-19 LAB
ABO GROUP BLD: NORMAL
ALBUMIN SERPL-MCNC: 3.5 G/DL (ref 3.5–5.2)
ALBUMIN/GLOB SERPL: 0.9 G/DL
ALP SERPL-CCNC: 222 U/L (ref 39–117)
ALT SERPL W P-5'-P-CCNC: 11 U/L (ref 1–33)
ANION GAP SERPL CALCULATED.3IONS-SCNC: 12 MMOL/L (ref 5–15)
AST SERPL-CCNC: 16 U/L (ref 1–32)
BACTERIA UR QL AUTO: NORMAL /HPF
BASOPHILS # BLD AUTO: 0.03 10*3/MM3 (ref 0–0.2)
BASOPHILS NFR BLD AUTO: 0.2 % (ref 0–1.5)
BILIRUB SERPL-MCNC: 0.4 MG/DL (ref 0–1.2)
BILIRUB UR QL STRIP: NEGATIVE
BLD GP AB SCN SERPL QL: NEGATIVE
BUN SERPL-MCNC: 4 MG/DL (ref 6–20)
BUN/CREAT SERPL: 7.5 (ref 7–25)
CALCIUM SPEC-SCNC: 8.8 MG/DL (ref 8.6–10.5)
CHLORIDE SERPL-SCNC: 104 MMOL/L (ref 98–107)
CLARITY UR: CLEAR
CO2 SERPL-SCNC: 20 MMOL/L (ref 22–29)
COLOR UR: YELLOW
CREAT SERPL-MCNC: 0.53 MG/DL (ref 0.57–1)
DEPRECATED RDW RBC AUTO: 38.5 FL (ref 37–54)
EGFRCR SERPLBLD CKD-EPI 2021: 135.1 ML/MIN/1.73
EOSINOPHIL # BLD AUTO: 0.01 10*3/MM3 (ref 0–0.4)
EOSINOPHIL NFR BLD AUTO: 0.1 % (ref 0.3–6.2)
ERYTHROCYTE [DISTWIDTH] IN BLOOD BY AUTOMATED COUNT: 13 % (ref 12.3–15.4)
GLOBULIN UR ELPH-MCNC: 3.8 GM/DL
GLUCOSE SERPL-MCNC: 81 MG/DL (ref 65–99)
GLUCOSE UR STRIP-MCNC: NEGATIVE MG/DL
HCT VFR BLD AUTO: 37.4 % (ref 34–46.6)
HGB BLD-MCNC: 12 G/DL (ref 12–15.9)
HGB UR QL STRIP.AUTO: NEGATIVE
HYALINE CASTS UR QL AUTO: NORMAL /LPF
IMM GRANULOCYTES # BLD AUTO: 0.07 10*3/MM3 (ref 0–0.05)
IMM GRANULOCYTES NFR BLD AUTO: 0.5 % (ref 0–0.5)
KETONES UR QL STRIP: ABNORMAL
LEUKOCYTE ESTERASE UR QL STRIP.AUTO: ABNORMAL
LYMPHOCYTES # BLD AUTO: 1.64 10*3/MM3 (ref 0.7–3.1)
LYMPHOCYTES NFR BLD AUTO: 11.9 % (ref 19.6–45.3)
MCH RBC QN AUTO: 26.6 PG (ref 26.6–33)
MCHC RBC AUTO-ENTMCNC: 32.1 G/DL (ref 31.5–35.7)
MCV RBC AUTO: 82.9 FL (ref 79–97)
MONOCYTES # BLD AUTO: 0.71 10*3/MM3 (ref 0.1–0.9)
MONOCYTES NFR BLD AUTO: 5.2 % (ref 5–12)
NEUTROPHILS NFR BLD AUTO: 11.3 10*3/MM3 (ref 1.7–7)
NEUTROPHILS NFR BLD AUTO: 82.1 % (ref 42.7–76)
NITRITE UR QL STRIP: NEGATIVE
NRBC BLD AUTO-RTO: 0 /100 WBC (ref 0–0.2)
PH UR STRIP.AUTO: 7 [PH] (ref 5–8)
PLATELET # BLD AUTO: 290 10*3/MM3 (ref 140–450)
PMV BLD AUTO: 10.3 FL (ref 6–12)
POTASSIUM SERPL-SCNC: 3.8 MMOL/L (ref 3.5–5.2)
PROT SERPL-MCNC: 7.3 G/DL (ref 6–8.5)
PROT UR QL STRIP: NEGATIVE
RBC # BLD AUTO: 4.51 10*6/MM3 (ref 3.77–5.28)
RBC # UR STRIP: NORMAL /HPF
REF LAB TEST METHOD: NORMAL
RH BLD: POSITIVE
SODIUM SERPL-SCNC: 136 MMOL/L (ref 136–145)
SP GR UR STRIP: <=1.005 (ref 1–1.03)
SQUAMOUS #/AREA URNS HPF: NORMAL /HPF
T&S EXPIRATION DATE: NORMAL
TREPONEMA PALLIDUM IGG+IGM AB [PRESENCE] IN SERUM OR PLASMA BY IMMUNOASSAY: NORMAL
UROBILINOGEN UR QL STRIP: ABNORMAL
WBC # UR STRIP: NORMAL /HPF
WBC NRBC COR # BLD AUTO: 13.76 10*3/MM3 (ref 3.4–10.8)

## 2024-10-19 PROCEDURE — 86850 RBC ANTIBODY SCREEN: CPT | Performed by: OBSTETRICS & GYNECOLOGY

## 2024-10-19 PROCEDURE — 25810000003 LACTATED RINGERS PER 1000 ML: Performed by: OBSTETRICS & GYNECOLOGY

## 2024-10-19 PROCEDURE — 86780 TREPONEMA PALLIDUM: CPT | Performed by: OBSTETRICS & GYNECOLOGY

## 2024-10-19 PROCEDURE — 87086 URINE CULTURE/COLONY COUNT: CPT | Performed by: OBSTETRICS & GYNECOLOGY

## 2024-10-19 PROCEDURE — 86900 BLOOD TYPING SEROLOGIC ABO: CPT | Performed by: OBSTETRICS & GYNECOLOGY

## 2024-10-19 PROCEDURE — 81001 URINALYSIS AUTO W/SCOPE: CPT | Performed by: OBSTETRICS & GYNECOLOGY

## 2024-10-19 PROCEDURE — 80053 COMPREHEN METABOLIC PANEL: CPT | Performed by: OBSTETRICS & GYNECOLOGY

## 2024-10-19 PROCEDURE — 99202 OFFICE O/P NEW SF 15 MIN: CPT | Performed by: OBSTETRICS & GYNECOLOGY

## 2024-10-19 PROCEDURE — 86901 BLOOD TYPING SEROLOGIC RH(D): CPT | Performed by: OBSTETRICS & GYNECOLOGY

## 2024-10-19 PROCEDURE — 85025 COMPLETE CBC W/AUTO DIFF WBC: CPT | Performed by: OBSTETRICS & GYNECOLOGY

## 2024-10-19 RX ORDER — FAMOTIDINE 20 MG/1
20 TABLET, FILM COATED ORAL 2 TIMES DAILY PRN
Status: DISCONTINUED | OUTPATIENT
Start: 2024-10-19 | End: 2024-10-20 | Stop reason: HOSPADM

## 2024-10-19 RX ORDER — ONDANSETRON 4 MG/1
4 TABLET, ORALLY DISINTEGRATING ORAL EVERY 6 HOURS PRN
Status: DISCONTINUED | OUTPATIENT
Start: 2024-10-19 | End: 2024-10-20 | Stop reason: HOSPADM

## 2024-10-19 RX ORDER — FENTANYL/ROPIVACAINE/NS/PF 2MCG/ML-.2
10 PLASTIC BAG, INJECTION (ML) INJECTION CONTINUOUS
Status: DISCONTINUED | OUTPATIENT
Start: 2024-10-19 | End: 2024-10-22 | Stop reason: HOSPADM

## 2024-10-19 RX ORDER — NALOXONE HCL 0.4 MG/ML
0.4 VIAL (ML) INJECTION
Status: DISCONTINUED | OUTPATIENT
Start: 2024-10-19 | End: 2024-10-20 | Stop reason: HOSPADM

## 2024-10-19 RX ORDER — ONDANSETRON 2 MG/ML
4 INJECTION INTRAMUSCULAR; INTRAVENOUS ONCE AS NEEDED
Status: DISCONTINUED | OUTPATIENT
Start: 2024-10-19 | End: 2024-10-20 | Stop reason: HOSPADM

## 2024-10-19 RX ORDER — ONDANSETRON 2 MG/ML
4 INJECTION INTRAMUSCULAR; INTRAVENOUS EVERY 6 HOURS PRN
Status: DISCONTINUED | OUTPATIENT
Start: 2024-10-19 | End: 2024-10-20 | Stop reason: HOSPADM

## 2024-10-19 RX ORDER — FAMOTIDINE 10 MG/ML
20 INJECTION, SOLUTION INTRAVENOUS ONCE AS NEEDED
Status: DISCONTINUED | OUTPATIENT
Start: 2024-10-19 | End: 2024-10-20 | Stop reason: HOSPADM

## 2024-10-19 RX ORDER — ACETAMINOPHEN 325 MG/1
650 TABLET ORAL EVERY 4 HOURS PRN
Status: DISCONTINUED | OUTPATIENT
Start: 2024-10-19 | End: 2024-10-20 | Stop reason: HOSPADM

## 2024-10-19 RX ORDER — DIPHENHYDRAMINE HYDROCHLORIDE 50 MG/ML
12.5 INJECTION INTRAMUSCULAR; INTRAVENOUS EVERY 8 HOURS PRN
Status: DISCONTINUED | OUTPATIENT
Start: 2024-10-19 | End: 2024-10-20 | Stop reason: HOSPADM

## 2024-10-19 RX ORDER — MAGNESIUM CARB/ALUMINUM HYDROX 105-160MG
30 TABLET,CHEWABLE ORAL ONCE AS NEEDED
Status: DISCONTINUED | OUTPATIENT
Start: 2024-10-19 | End: 2024-10-20 | Stop reason: HOSPADM

## 2024-10-19 RX ORDER — EPHEDRINE SULFATE 50 MG/ML
5 INJECTION, SOLUTION INTRAVENOUS
Status: DISCONTINUED | OUTPATIENT
Start: 2024-10-19 | End: 2024-10-20 | Stop reason: HOSPADM

## 2024-10-19 RX ORDER — TERBUTALINE SULFATE 1 MG/ML
0.25 INJECTION, SOLUTION SUBCUTANEOUS AS NEEDED
Status: DISCONTINUED | OUTPATIENT
Start: 2024-10-19 | End: 2024-10-20 | Stop reason: HOSPADM

## 2024-10-19 RX ORDER — SODIUM CHLORIDE, SODIUM LACTATE, POTASSIUM CHLORIDE, CALCIUM CHLORIDE 600; 310; 30; 20 MG/100ML; MG/100ML; MG/100ML; MG/100ML
125 INJECTION, SOLUTION INTRAVENOUS CONTINUOUS
Status: ACTIVE | OUTPATIENT
Start: 2024-10-19 | End: 2024-10-21

## 2024-10-19 RX ORDER — MORPHINE SULFATE 2 MG/ML
1 INJECTION, SOLUTION INTRAMUSCULAR; INTRAVENOUS EVERY 4 HOURS PRN
Status: DISCONTINUED | OUTPATIENT
Start: 2024-10-19 | End: 2024-10-20 | Stop reason: HOSPADM

## 2024-10-19 RX ORDER — FAMOTIDINE 10 MG/ML
20 INJECTION, SOLUTION INTRAVENOUS 2 TIMES DAILY PRN
Status: DISCONTINUED | OUTPATIENT
Start: 2024-10-19 | End: 2024-10-20 | Stop reason: HOSPADM

## 2024-10-19 RX ADMIN — SODIUM CHLORIDE, POTASSIUM CHLORIDE, SODIUM LACTATE AND CALCIUM CHLORIDE 999 ML/HR: 600; 310; 30; 20 INJECTION, SOLUTION INTRAVENOUS at 23:50

## 2024-10-19 NOTE — H&P
[facility]  Obstetric History and Physical    Chief Complaint   Patient presents with    Contractions     Pt presents to [facility] for c/o ctx that became severe around 1300. +FM. Denies VB, LOF. Approx 3 mins apart.        Subjective     Patient is a 19 y.o. female  currently at 37w5d, who presents with complaints of painful contractions.  Patient has been in triage and her cervix changed from 2 to 3 cm.  She reports the pain as 7 out of 10.  She is reports good fetal movement.  GBS is negative.    Her prenatal care is benign.  Her previous obstetric/gynecological history is noted for is non-contributory.    The following portions of the patients history were reviewed and updated as appropriate: past medical history, past surgical history, past social history, and problem list .       Prenatal Information:  Prenatal Results       Initial Prenatal Labs       Test Value Reference Range Date Time    Hemoglobin  13.5 g/dL 12.0 - 15.9 24       13.7 g/dL 12.0 - 15.9 24 0957    Hematocrit  40.9 % 34.0 - 46.6 24       42.2 % 34.0 - 46.6 24 0957    Platelets  359 10*3/mm3 140 - 450 24       335 10*3/mm3 140 - 450 24 0957    Rubella IgG  16.40 index Immune >0.99 24 0957    Hepatitis B SAg  Non-Reactive  Non-Reactive 24 0957    Hepatitis C Ab  Non-Reactive  Non-Reactive 24 0957    RPR  Non Reactive  Non Reactive 24 1145       Non-Reactive  Non-Reactive 24 0957    T. Pallidum Ab         ABO  A   24 0957    Rh  Positive   24 0957    Antibody Screen  Negative   24 0957    HIV  Non-Reactive  Non-Reactive 24 0957    Urine Culture  No growth   247       No growth   24 1736       25,000 CFU/mL Normal Urogenital Marlene   24 1311       >100,000 CFU/mL Mixed Gram Positive Marlene   24       Final report   24 1707    Gonorrhea  Negative  Negative 24 1535       Negative  Negative 24  1102       Negative  Negative 03/14/24 1703    Chlamydia  Negative  Negative 09/04/24 1535       Negative  Negative 08/09/24 1102       Negative  Negative 03/14/24 1703    TSH        HgB A1c         Varicella IgG        Hemoglobinopathy Fractionation        Hemoglobinopathy (genetic testing)        Cystic fibrosis                   Fetal testing        Test Value Reference Range Date Time    NIPT        MSAFP  *Screen Negative*   06/13/24 1549    AFP-4                  2nd and 3rd Trimester       Test Value Reference Range Date Time    Hemoglobin (repeated)  12.1 g/dL 12.0 - 15.9 08/09/24 1145    Hematocrit (repeated)  37.3 % 34.0 - 46.6 08/09/24 1145    Platelets   319 10*3/mm3 140 - 450 08/09/24 1145       359 10*3/mm3 140 - 450 04/04/24 2016       335 10*3/mm3 140 - 450 03/18/24 0957    1 hour GTT   100 mg/dL 65 - 139 08/09/24 1145    Antibody Screen (repeated)        3rd TM syphilis scrn (repeated)  RPR   Non Reactive  Non Reactive 08/09/24 1145    3rd TM syphilis scrn (repeated) TP-Ab        3rd TM syphilis screen TB-Ab (FTA)        Syphilis cascade test TP-Ab (EIA)        Syphilis cascade TPPA        GTT Fasting        GTT 1 Hr        GTT 2 Hr        GTT 3 Hr        Group B Strep  Negative  Negative 10/01/24 1340              Other testing        Test Value Reference Range Date Time    Parvo IgG         CMV IgG                   Drug Screening       Test Value Reference Range Date Time    Amphetamine Screen        Barbiturate Screen        Benzodiazepine Screen        Methadone Screen        Phencyclidine Screen        Opiates Screen        THC Screen        Cocaine Screen        Propoxyphene Screen        Buprenorphine Screen        Methamphetamine Screen        Oxycodone Screen        Tricyclic Antidepressants Screen                  Legend    ^: Historical                          External Prenatal Results       Pregnancy Outside Results - Transcribed From Office Records - See Scanned Records For Details        Test Value Date Time    ABO  A  24 0957    Rh  Positive  24 0957    Antibody Screen  Negative  24 0957    Varicella IgG       Rubella  16.40 index 24 0957    Hgb  12.1 g/dL 24 1145       13.5 g/dL 24       13.7 g/dL 24 0957    Hct  37.3 % 24 1145       40.9 % 24       42.2 % 24 0957    HgB A1c        1h GTT  100 mg/dL 24 1145    3h GTT Fasting       3h GTT 1 hour       3h GTT 2 hour       3h GTT 3 hour        Gonorrhea (discrete)  Negative  24 1535       Negative  24 1102       Negative  24 1703    Chlamydia (discrete)  Negative  24 1535       Negative  24 1102       Negative  24 1703    RPR  Non Reactive  24 1145       Non-Reactive  24 0957    Syphils cascade: TP-Ab (FTA)       TP-Ab       TP-Ab (EIA)       TPPA       HBsAg  Non-Reactive  24 0957    Herpes Simplex Virus PCR       Herpes Simplex VIrus Culture       HIV  Non-Reactive  24 0957    Hep C RNA Quant PCR       Hep C Antibody  Non-Reactive  24 0957    AFP  65.2 ng/mL 24 1549    NIPT       Cystic Fibroisis        Group B Strep  Negative  10/01/24 1340    GBS Susceptibility to Clindamycin       GBS Susceptibility to Erythromycin       Fetal Fibronectin       Genetic Testing, Maternal Blood                 Drug Screening       Test Value Date Time    Urine Drug Screen       Amphetamine Screen       Barbiturate Screen       Benzodiazepine Screen       Methadone Screen       Phencyclidine Screen       Opiates Screen       THC Screen       Cocaine Screen       Propoxyphene Screen       Buprenorphine Screen       Methamphetamine Screen       Oxycodone Screen       Tricyclic Antidepressants Screen                 Legend    ^: Historical                             Past OB History:     OB History    Para Term  AB Living   1 0 0 0 0 0   SAB IAB Ectopic Molar Multiple Live Births   0 0 0 0 0 0      #  Outcome Date GA Lbr Tod/2nd Weight Sex Type Anes PTL Lv   1 Current                Past Medical History: Past Medical History:   Diagnosis Date    Depression 12/2018      Past Surgical History History reviewed. No pertinent surgical history.   Family History: Family History   Problem Relation Age of Onset    Diabetes Maternal Grandmother     Hypertension Maternal Grandmother     Breast cancer Paternal Grandmother       Social History:  reports that she has never smoked. She has never been exposed to tobacco smoke. She has never used smokeless tobacco.   reports no history of alcohol use.   reports no history of drug use.        General ROS: Pertinent items are noted in HPI    Objective       Vital Signs Range for the last 24 hours  Temperature: Temp:  [97.6 °F (36.4 °C)] 97.6 °F (36.4 °C)   Temp Source: Temp src: Oral   BP: BP: (113-133)/(76-87) 113/76   Pulse: Heart Rate:  [] 91   Respirations: Resp:  [20] 20   SPO2: SpO2:  [97 %] 97 %   O2 Amount (l/min):     O2 Devices     Weight:       Physical Examination: General appearance - alert, well appearing, and in no distress  Mental status - normal mood, behavior, speech, dress, motor activity, and thought processes  Eyes - sclera anicteric  Abdomen -gravid, size equal to dates  Pelvic -3 cm and 80% per RN    Presentation: vertex   Cervix: Exam by: Method: sterile vaginal exam performed   Dilation: Cervical Dilation (cm): 3   Effacement: Cervical Effacement: 80   Station:         Fetal Heart Rate Assessment   Method: Fetal HR Assessment Method: external   Beats/min: Fetal HR (beats/min): 120   Baseline: Fetal HR Baseline: normal range   Variability: Fetal HR Variability: moderate (amplitude range 6 to 25 bpm)   Accels: Fetal HR Accelerations: greater than/equal to 15 bpm, lasting at least 15 seconds   Decels: Fetal HR Decelerations: absent   Tracing Category:       Uterine Assessment   Method: Method: external tocotransducer, palpation   Frequency (min):  Contraction Frequency (Minutes): 2-4.5   Ctx Count in 10 min: Contractions in 10 Minutes: 4   Duration:     Intensity: Contraction Intensity: mild by palpation   Intensity by IUPC:     Resting Tone: Uterine Resting Tone: soft by palpation   Resting Tone by IUPC:     [place] Units:       Laboratory Results:       Radiology Review: Fetal weight at 31 weeks was at the 80th percentile.  Abdominal circumference at the 63rd percentile.      Assessment & Plan       Pregnant        Assessment:  1.  Intrauterine pregnancy at 37w5d gestation with reactive, reassuring fetal status.    2.  labor  without ROM  3.  Obstetrical history significant for is non-contributory.  4.  GBS status:   Strep Gp B Culture   Date Value Ref Range Status   10/01/2024 Negative Negative Final     Comment:     Centers for Disease Control and Prevention (CDC) and American Congress  of Obstetricians and Gynecologists (ACOG) guidelines for prevention of   group B streptococcal (GBS) disease specify co-collection of  a vaginal and rectal swab specimen to maximize sensitivity of GBS  detection. Per the CDC and ACOG, swabbing both the lower vagina and  rectum substantially increases the yield of detection compared with  sampling the vagina alone.  Penicillin G, ampicillin, or cefazolin are indicated for intrapartum  prophylaxis of  GBS colonization. Reflex susceptibility  testing should be performed prior to use of clindamycin only on GBS  isolates from penicillin-allergic women who are considered a high risk  for anaphylaxis. Treatment with vancomycin without additional testing  is warranted if resistance to clindamycin is noted.         Plan:  1. fetal and uterine monitoring  continuously patient desires natural childbirth.  She would like to use nitrous oxide.  2. Plan of care has been reviewed with patient   3.  Risks, benefits of treatment plan have been discussed.  4.  All questions have been answered.        Fazal Ortega,  MD  10/19/2024  18:53 EDT

## 2024-10-19 NOTE — PROGRESS NOTES
PATIENT ASSESSMENT FOR ADMINISTRATION OF NITROUS OXIDE FOR PAIN MANAGEMENT       Nitrous Oxide Assessment      This document serves as an assessment for pain management use of Nitrous Oxide in Labor & Delivery.    The following medical conditions are contraindicated in the use of Nitrous Oxide.       - Physical inability to hold own face mask in place      - Pernicious anemia or B12 deficiency     - Daily Methadone or Buprenorphine use (Subtex or Suboxone)     - Any concern for poor fetal status      - Current diagnosis of sleep apnea      - Recent intraocular surgery      - Increased intraocular pressure     - Increased intracranial pressure      - Pulmonary Hypertension      - Recent bowel obstruction      - Recent Middle ear surgery       I have assessed and determined that Eryn Humphrey is a candidate for the use of Nitrous Oxide for pain management.     ** ACOG PB#209 Based on the concern for potential maternal adverse consequences, co-administration of systemic opioids or sedatives/hypnotics and inhaled nitrous oxide for labor analgesia is not recommended.    Medical conditions may arise or change during the course of pregnancy, labor, delivery and/or postpartum period that may void this assessment tool making the patient no longer a candidate for nitrous oxide use.  Refer to Nitrous Oxide for Analgesia in the Obstetrical patient policy for additional contraindications that may deem the patient not a candidate.     Fazal Ortgea MD  10/19/2024  18:57 EDT

## 2024-10-19 NOTE — OBED NOTES
"[facility]  Eryn Humphrey  : 2005  MRN: [id number]  CSN: [id number]    OB ED Provider Note    Subjective   Chief Complaint   Patient presents with    Contractions     Pt presents to [facility] for c/o ctx that became severe around 1300. +FM. Denies VB, LOF. Approx 3 mins apart.      Eryn Humphrey is a 19 y.o. year old  with an Estimated Date of Delivery: 24 currently at 37w5d presenting with contractions occurring every few minutes. She denies leaking or bleeding.    Prenatal care has been with Dr. Delgado.  It has been uncomplicated.    OB History    Para Term  AB Living   1 0 0 0 0 0   SAB IAB Ectopic Molar Multiple Live Births   0 0 0 0 0 0      # Outcome Date GA Lbr Tod/2nd Weight Sex Type Anes PTL Lv   1 Current              Past Medical History:   Diagnosis Date    Depression 2018     No past surgical history on file.  No current facility-administered medications for this encounter.    No Known Allergies  Social History    Tobacco Use      Smoking status: Never        Passive exposure: Never      Smokeless tobacco: Never    Review of Systems  +abdominal pain      Objective   /87 (BP Location: Left arm, Patient Position: Lying)   Pulse 102   Temp 97.6 °F (36.4 °C) (Oral)   Resp 20   Ht 157.5 cm (62\")   SpO2 97%   BMI 30.54 kg/m²   General: well developed; well nourished  no acute distress  mentation appropriate   Abdomen: soft, non-tender; no masses  gravid    FHT's: NST reactive, 140, moderate, +accels, no decels      Cervix: was checked (by RN): 2 cm / 70 % / -2   Presentation: cephalic   Contractions: regular   Chest: Unlabored respirations    CV:  normal rate, regular rhythm,  no murmurs, rubs, or gallops   Ext:   No C/C/E   Back: CVA tenderness is absent bilateral        Prenatal Labs  Lab Results   Component Value Date    HGB 12.1 2024    RUBELLAABIGG 16.40 2024    HEPBSAG Non-Reactive 2024    ABSCRN Negative 2024    HKY0PUL9 " Non-Reactive 03/18/2024    HEPCVIRUSABY Non-Reactive 03/18/2024     08/09/2024    STREPGPB Negative 10/01/2024    URINECX No growth 09/29/2024    CHLAMNAA Negative 09/04/2024    NGONORRHON Negative 09/04/2024          Assessment and Plan  IUP at 37w5d with contractions  SVE 2-3/70 then changed to 3/70 but hasn't changed for past 3 hours, nishi every 2-3 minutes  GBS neg  Fetal status reassuring, NST reactive  Discussed w/ Dr. Ortega, she will come and see patient       Deepika Villanueva MD  10/19/2024  14:59 EDT

## 2024-10-20 PROCEDURE — 0HQ9XZZ REPAIR PERINEUM SKIN, EXTERNAL APPROACH: ICD-10-PCS | Performed by: OBSTETRICS & GYNECOLOGY

## 2024-10-20 PROCEDURE — 59410 OBSTETRICAL CARE: CPT | Performed by: OBSTETRICS & GYNECOLOGY

## 2024-10-20 PROCEDURE — 25010000002 ROPIVACAINE PER 1 MG: Performed by: ANESTHESIOLOGY

## 2024-10-20 PROCEDURE — 25010000002 ONDANSETRON PER 1 MG: Performed by: OBSTETRICS & GYNECOLOGY

## 2024-10-20 PROCEDURE — C1755 CATHETER, INTRASPINAL: HCPCS | Performed by: ANESTHESIOLOGY

## 2024-10-20 PROCEDURE — 25810000003 LACTATED RINGERS PER 1000 ML: Performed by: OBSTETRICS & GYNECOLOGY

## 2024-10-20 PROCEDURE — 10907ZC DRAINAGE OF AMNIOTIC FLUID, THERAPEUTIC FROM PRODUCTS OF CONCEPTION, VIA NATURAL OR ARTIFICIAL OPENING: ICD-10-PCS | Performed by: OBSTETRICS & GYNECOLOGY

## 2024-10-20 PROCEDURE — 25010000002 LIDOCAINE-EPINEPHRINE 1.5 %-1:200000 SOLUTION: Performed by: ANESTHESIOLOGY

## 2024-10-20 PROCEDURE — 0UQMXZZ REPAIR VULVA, EXTERNAL APPROACH: ICD-10-PCS | Performed by: OBSTETRICS & GYNECOLOGY

## 2024-10-20 RX ORDER — BISACODYL 10 MG
10 SUPPOSITORY, RECTAL RECTAL DAILY PRN
Status: DISCONTINUED | OUTPATIENT
Start: 2024-10-21 | End: 2024-10-22 | Stop reason: HOSPADM

## 2024-10-20 RX ORDER — HYDROCODONE BITARTRATE AND ACETAMINOPHEN 5; 325 MG/1; MG/1
1 TABLET ORAL EVERY 4 HOURS PRN
Status: DISCONTINUED | OUTPATIENT
Start: 2024-10-20 | End: 2024-10-22 | Stop reason: HOSPADM

## 2024-10-20 RX ORDER — HYDROCORTISONE 25 MG/G
CREAM TOPICAL AS NEEDED
Status: DISCONTINUED | OUTPATIENT
Start: 2024-10-20 | End: 2024-10-22 | Stop reason: HOSPADM

## 2024-10-20 RX ORDER — MISOPROSTOL 200 UG/1
800 TABLET ORAL ONCE AS NEEDED
Status: DISCONTINUED | OUTPATIENT
Start: 2024-10-20 | End: 2024-10-20 | Stop reason: HOSPADM

## 2024-10-20 RX ORDER — OXYTOCIN/0.9 % SODIUM CHLORIDE 30/500 ML
250 PLASTIC BAG, INJECTION (ML) INTRAVENOUS CONTINUOUS
Status: DISPENSED | OUTPATIENT
Start: 2024-10-20 | End: 2024-10-20

## 2024-10-20 RX ORDER — TRANEXAMIC ACID 10 MG/ML
1000 INJECTION, SOLUTION INTRAVENOUS ONCE AS NEEDED
Status: DISCONTINUED | OUTPATIENT
Start: 2024-10-20 | End: 2024-10-22 | Stop reason: HOSPADM

## 2024-10-20 RX ORDER — ACETAMINOPHEN 325 MG/1
650 TABLET ORAL EVERY 6 HOURS PRN
Status: DISCONTINUED | OUTPATIENT
Start: 2024-10-20 | End: 2024-10-22 | Stop reason: HOSPADM

## 2024-10-20 RX ORDER — HYDROCODONE BITARTRATE AND ACETAMINOPHEN 10; 325 MG/1; MG/1
1 TABLET ORAL EVERY 4 HOURS PRN
Status: DISCONTINUED | OUTPATIENT
Start: 2024-10-20 | End: 2024-10-22 | Stop reason: HOSPADM

## 2024-10-20 RX ORDER — OXYTOCIN/0.9 % SODIUM CHLORIDE 30/500 ML
2-20 PLASTIC BAG, INJECTION (ML) INTRAVENOUS
Status: DISCONTINUED | OUTPATIENT
Start: 2024-10-20 | End: 2024-10-22 | Stop reason: HOSPADM

## 2024-10-20 RX ORDER — ONDANSETRON 2 MG/ML
4 INJECTION INTRAMUSCULAR; INTRAVENOUS EVERY 6 HOURS PRN
Status: DISCONTINUED | OUTPATIENT
Start: 2024-10-20 | End: 2024-10-22 | Stop reason: HOSPADM

## 2024-10-20 RX ORDER — CARBOPROST TROMETHAMINE 250 UG/ML
250 INJECTION, SOLUTION INTRAMUSCULAR
Status: DISCONTINUED | OUTPATIENT
Start: 2024-10-20 | End: 2024-10-20 | Stop reason: HOSPADM

## 2024-10-20 RX ORDER — OXYTOCIN/0.9 % SODIUM CHLORIDE 30/500 ML
125 PLASTIC BAG, INJECTION (ML) INTRAVENOUS ONCE AS NEEDED
Status: COMPLETED | OUTPATIENT
Start: 2024-10-20 | End: 2024-10-20

## 2024-10-20 RX ORDER — DIPHENHYDRAMINE HCL 25 MG
25 CAPSULE ORAL NIGHTLY PRN
Status: DISCONTINUED | OUTPATIENT
Start: 2024-10-20 | End: 2024-10-22 | Stop reason: HOSPADM

## 2024-10-20 RX ORDER — LIDOCAINE HYDROCHLORIDE AND EPINEPHRINE 15; 5 MG/ML; UG/ML
INJECTION, SOLUTION EPIDURAL AS NEEDED
Status: DISCONTINUED | OUTPATIENT
Start: 2024-10-20 | End: 2024-10-20 | Stop reason: SURG

## 2024-10-20 RX ORDER — METHYLERGONOVINE MALEATE 0.2 MG/ML
200 INJECTION INTRAVENOUS ONCE AS NEEDED
Status: DISCONTINUED | OUTPATIENT
Start: 2024-10-20 | End: 2024-10-20 | Stop reason: HOSPADM

## 2024-10-20 RX ORDER — IBUPROFEN 600 MG/1
600 TABLET, FILM COATED ORAL EVERY 6 HOURS PRN
Status: DISCONTINUED | OUTPATIENT
Start: 2024-10-20 | End: 2024-10-22 | Stop reason: HOSPADM

## 2024-10-20 RX ORDER — DOCUSATE SODIUM 100 MG/1
100 CAPSULE, LIQUID FILLED ORAL 2 TIMES DAILY
Status: DISCONTINUED | OUTPATIENT
Start: 2024-10-20 | End: 2024-10-22 | Stop reason: HOSPADM

## 2024-10-20 RX ORDER — OXYTOCIN/0.9 % SODIUM CHLORIDE 30/500 ML
999 PLASTIC BAG, INJECTION (ML) INTRAVENOUS ONCE
Status: DISCONTINUED | OUTPATIENT
Start: 2024-10-20 | End: 2024-10-20 | Stop reason: HOSPADM

## 2024-10-20 RX ORDER — ROPIVACAINE HYDROCHLORIDE 2 MG/ML
INJECTION, SOLUTION EPIDURAL; INFILTRATION; PERINEURAL AS NEEDED
Status: DISCONTINUED | OUTPATIENT
Start: 2024-10-20 | End: 2024-10-20 | Stop reason: SURG

## 2024-10-20 RX ADMIN — ROPIVACAINE HYDROCHLORIDE 10 ML: 2 INJECTION, SOLUTION EPIDURAL; INFILTRATION at 00:20

## 2024-10-20 RX ADMIN — Medication 2 MILLI-UNITS/MIN: at 02:22

## 2024-10-20 RX ADMIN — LIDOCAINE HYDROCHLORIDE AND EPINEPHRINE 3 ML: 15; 5 INJECTION, SOLUTION EPIDURAL at 01:06

## 2024-10-20 RX ADMIN — Medication 10 ML/HR: at 00:25

## 2024-10-20 RX ADMIN — DOCUSATE SODIUM 100 MG: 100 CAPSULE, LIQUID FILLED ORAL at 20:26

## 2024-10-20 RX ADMIN — DOCUSATE SODIUM 100 MG: 100 CAPSULE, LIQUID FILLED ORAL at 14:34

## 2024-10-20 RX ADMIN — SODIUM CHLORIDE, POTASSIUM CHLORIDE, SODIUM LACTATE AND CALCIUM CHLORIDE 125 ML/HR: 600; 310; 30; 20 INJECTION, SOLUTION INTRAVENOUS at 01:12

## 2024-10-20 RX ADMIN — ONDANSETRON 4 MG: 2 INJECTION INTRAMUSCULAR; INTRAVENOUS at 06:23

## 2024-10-20 RX ADMIN — ACETAMINOPHEN 650 MG: 325 TABLET ORAL at 20:26

## 2024-10-20 RX ADMIN — Medication: at 18:20

## 2024-10-20 RX ADMIN — Medication 125 ML/HR: at 08:30

## 2024-10-20 NOTE — PROGRESS NOTES
[facility]   Obstetrics and Gynecology     10/20/2024    Name:Eryn Humphrey   MR#:[id number]    Vaginal Delivery Progress Note    HD#1    Subjective   Postpartum Day 1: 19 y.o. female  delivered at 37w6d  delivered a male infant.     The patient feels well.  Her pain is controlled.    She is ambulating well.  Patient describes her bleeding as moderate lochia.    Breastfeeding/bottle:  The patient is currently breastfeeding.    Patient Active Problem List   Diagnosis    Alpha thalassemia silent carrier    Prenatal care, third trimester    Pregnant       Objective   Vital Signs Range for the last 24 hours  Temp: Temp:  [97.4 °F (36.3 °C)-100.2 °F (37.9 °C)] 97.4 °F (36.3 °C) Temp src: Axillary   BP: BP: ()/(47-87) 119/69        Pulse: Heart Rate:  [] 99  RR: Resp:  [16-20] 16  Weight: 76.7 kg (169 lb)  BMI:  Body mass index is 30.91 kg/m².    Results from last 7 days   Lab Units 10/19/24  1914   WBC 10*3/mm3 13.76*   HEMOGLOBIN g/dL 12.0   HEMATOCRIT % 37.4   PLATELETS 10*3/mm3 290     Results from last 7 days   Lab Units 10/19/24  2054   SODIUM mmol/L 136   POTASSIUM mmol/L 3.8   CHLORIDE mmol/L 104   CO2 mmol/L 20.0*   BUN mg/dL 4*   CREATININE mg/dL 0.53*   CALCIUM mg/dL 8.8   ALK PHOS U/L 222*   ALT (SGPT) U/L 11   AST (SGOT) U/L 16   GLUCOSE mg/dL 81       Physical Exam  Vitals and nursing note reviewed.   Constitutional:       General: She is not in acute distress.     Appearance: Normal appearance.   Cardiovascular:      Pulses: Normal pulses.   Pulmonary:      Effort: Pulmonary effort is normal.   Abdominal:      General: There is no distension.      Palpations: Abdomen is soft.      Tenderness: There is no abdominal tenderness. There is no guarding or rebound.   Genitourinary:     Comments: Uterus firm and below umbilicus  Musculoskeletal:         General: No swelling or tenderness.      Right lower leg: No edema.      Left lower leg: No edema.   Neurological:      Mental  Status: She is alert and oriented to person, place, and time.   Psychiatric:         Mood and Affect: Mood normal.         Behavior: Behavior normal.         Assessment/Plan   1.  PPD# 1    Pregnant      Plan:   Continue routine postpartum care    Rosie Pa MD  10/20/2024 10:54 EDT

## 2024-10-20 NOTE — LACTATION NOTE
Lactation Consult Note  P1, 37.6 weeks baby- new admission. Rounded on mom at this time and she asked for help with BF. Reports baby has been very sleepy. Infant's mouth assessed and significant TT noticed, but is thin and elastic.  Baby is able to stick his tongue further than the gum line, which is good. Discussed with parents different ways for frenotomy to be performed. Assisted mom with waking up baby and latching him on the left breast in a cross cradle position. Educated PT on starting nipple to nose to achieve deep latch and baby was able to do it after few attempts and some suck training. He is latching well and has a good jaw rotation, but is falling asleep. Educated mom on different ways to keep baby awake during BF.  Encouraged her to BF baby every 2-3 hours for 15 min on each breast. Educated on the importance of deep latch, hand expression, how to know if infant is getting enough and burping baby between breasts. Mother is able to express colostrum easily. She denies any questions. Mom has PBP. Encouraged to call if needing further help.       Evaluation Completed: 10/20/2024 17:25 EDT  Patient Name: Eryn Humphrey  :  2005  MRN:  [id number]     REFERRAL  INFORMATION:                          Date of Referral: 10/20/24   Person Making Referral: patient, lactation consultant  Maternal Reason for Referral: breastfeeding currently  Infant Reason for Referral: sleepy, tight frenulum    DELIVERY HISTORY:        Skin to skin initiation date/time: 10/20/2024 7:10 AM  Skin to skin end date/time: 10/20/2024 8:15 AM       MATERNAL ASSESSMENT:  Breast Size Issue: none (10/20/24 163)  Breast Shape: Bilateral:, round (10/20/24 163)  Breast Density: Bilateral:, soft (10/20/24 163)  Areola: Bilateral:, elastic (10/20/24 163)  Nipples: Bilateral:, everted, graspable (10/20/24 163)                INFANT ASSESSMENT:  Information for the patient's :  VeronicaNuvia laird [[id number]]   No past medical  history on file.  Feeding Readiness Cues: sleeping (10/20/24 1632)     Feeding Tolerance/Success: arousal required, eager, rooting, sleepy (10/20/24 1632)              Feeding Interventions: arousal required, jaw supported, latch assistance provided, lips stroked, sucking promoted (10/20/24 1632)              Breastfeeding: breastfeeding, left side only (10/20/24 1632)  Infant Positioning: cross-cradle, cradle (10/20/24 1632)        Effective Latch During Feeding: yes (10/20/24 1632)  Suck/Swallow/Breathing Coordination: present (10/20/24 1632)  Signs of Milk Transfer: deep jaw excursions noted (10/20/24 1632)      Latch: 2-->grasps breast, tongue down, lips flanged, rhythmic sucking (10/20/24 1632)  Audible Swallowin-->a few with stimulation (10/20/24 1632)  Type of Nipple: 2-->everted (after stimulation) (10/20/24 1632)  Comfort (Breast/Nipple): 2-->soft/nontender (10/20/24 1632)  Hold (Positioning): 1-->minimal assist, teach one side, mother does other, staff holds (10/20/24 1632)  Latch Score: 8 (10/20/24 1632)                   MATERNAL INFANT FEEDING:     Maternal Emotional State: relaxed, receptive (10/20/24 1632)  Infant Positioning: cross-cradle, cradle (10/20/24 1632)   Signs of Milk Transfer: deep jaw excursions noted (10/20/24 1632)  Pain with Feeding: no (10/20/24 1632)           Milk Ejection Reflex: present (10/20/24 1632)           Latch Assistance: minimal assistance (10/20/24 1632)                               EQUIPMENT TYPE:                                 BREAST PUMPING:          LACTATION REFERRALS:

## 2024-10-20 NOTE — PLAN OF CARE
Goal Outcome Evaluation:  Plan of Care Reviewed With: patient, spouse        Progress: improving  Outcome Evaluation: 37.6 Spontaneous onset of labor. S/P  at 0709. Baby boy apgars 9,9. QBL from delivery 280. 1st degree tear repaired. Father currently in skin to skin with infant. Fundus firm, midline, U/2. scant bleeding. pt reports no pain at this time. Plan to move patient and baby to postpartum in two hours if bleeding and vitals remain stable.

## 2024-10-20 NOTE — ANESTHESIA POSTPROCEDURE EVALUATION
Patient: Eryn Humphrey    Procedure Summary       Date: 10/20/24 Room / Location:     Anesthesia Start: 0005 Anesthesia Stop: 0709    Procedure: LABOR ANALGESIA Diagnosis:     Scheduled Providers:  Provider: Shyanne Vora MD    Anesthesia Type: epidural ASA Status: 2            Anesthesia Type: epidural    Vitals  Vitals Value Taken Time   /69 10/20/24 1045   Temp 36.3 °C (97.4 °F) 10/20/24 1045   Pulse 99 10/20/24 1045   Resp 16 10/20/24 1045   SpO2 100 % 10/20/24 1045           Post Anesthesia Care and Evaluation      Comments: No apparent anesthetic complications

## 2024-10-20 NOTE — ANESTHESIA PREPROCEDURE EVALUATION
Anesthesia Evaluation                  Airway   Mallampati: II  TM distance: >3 FB  Neck ROM: full  No difficulty expected  Dental - normal exam     Pulmonary    Cardiovascular         Neuro/Psych  (+) psychiatric history Depression  GI/Hepatic/Renal/Endo      Musculoskeletal     Abdominal    Substance History      OB/GYN    (+) Pregnant    Comment: 37 w 5 days      Other          Other Comment: Alpha thal trait              Anesthesia Plan    ASA 2     epidural       Anesthetic plan, risks, benefits, and alternatives have been provided, discussed and informed consent has been obtained with: patient.    CODE STATUS:    Level Of Support Discussed With: Patient  Code Status (Patient has no pulse and is not breathing): CPR (Attempt to Resuscitate)  Medical Interventions (Patient has pulse or is breathing): Full Support

## 2024-10-20 NOTE — L&D DELIVERY NOTE
[facility]   Vaginal Delivery Note    Patient Name: Eryn Humphrey  : 2005  MRN: [id number]    Date of Delivery: 10/20/2024    Diagnosis     Pre & Post-Delivery:  Intrauterine pregnancy at 37w6d  Labor status: Spontaneous Onset of Labor    Pregnant             Problem List    Transfer to Postpartum     Review the Delivery Report for details.     Delivery     Delivery: Vaginal, Spontaneous    YOB: 2026   Time of Birth:  Gestational Age 7:09 AM  37w6d     Anesthesia: Epidural    Delivering clinician: Fazal Ortega   Forceps?   No   Vacuum? No    Shoulder dystocia present: No        Delivery narrative: The patient is a 19-year-old  1 para 0 at 37 sick sevenths weeks who presented in term labor.  Patient was admitted.  Her GBS was negative.  She initially desired natural childbirth and did use the nitrous oxide but eventually requested an epidural.  Amniotomy revealed clear fluid.  Patient dilated to 5 cm but then had no cervical change so Pitocin was started.  Patient progressed to complete.  She pushed with good effort and when the baby was  she was prepped and draped for delivery.  Fetal head delivered and the shoulders were quickly delivering behind.  Baby was placed on mom's abdomen and a nurse and mouth were bulb suction.  After 30 seconds the cord was clamped and cut.  Cord blood was collected.  Placenta was delivered and noted to be complete.  Pitocin was started per protocol.  Uterus was massaged for 30 seconds.  There was a first-degree vaginal, perineal and right labial laceration that was repaired with 3-0 Vicryl.  The labial portion was repaired separately.  There was still some bleeding at the introitus and 2 separate figure-of-eight sutures of 0 Vicryl were placed.  Hemostasis was then noted.  Fundus was firm.  Mom and baby are recovering well.      Infant     Findings: male infant     Infant observations: Weight: No birth weight on file.  Length:    "in  Observations/Comments:  LDR6 [place]     Apgars: 9  @ 1 minute /    9  @ 5 minutes   Infant Name: CJ     Placenta & Cord         Placenta delivered  Spontaneous at   10/20/2024  7:12 AM    Cord: 3 vessels present.   Nuchal Cord?  no   Cord blood obtained: Yes   Cord gases obtained:      Cord gas results: Venous:  No results found for: \"PHCVEN\", \"BECVEN\"    Arterial:  No results found for: \"PHCART\", \"BECART\"     Repair     Episiotomy: None    No    Lacerations: Yes  Laceration Information  Laceration Repaired?   Perineal:       Periurethral:       Labial:       Sulcus:       Vaginal:       Cervical:         Suture used for repair: 3-0 Vicryl     Estimated Blood Loss:  250 cc     Quantitative Blood Loss:  See epic record        Complications     none    Disposition     Mother to Mother Baby/Postpartum  in stable condition currently.  Baby to remains with mom  in stable condition currently.    Fazal Ortega MD  10/20/24  07:33 EDT        "

## 2024-10-20 NOTE — PLAN OF CARE
Goal Outcome Evaluation:      , 37.6 weeks, pitocin augmentation, pt. Educated on pitocin medication, heart tones reactive, questions/concerns encouraged, pt. Verbalizes understanding.

## 2024-10-20 NOTE — ANESTHESIA PROCEDURE NOTES
Labor Epidural      Patient reassessed immediately prior to procedure    Patient location during procedure: OB  Start Time: 10/20/2024 1:05 AM  Performed By  Anesthesiologist: Shyanne Vora MD  Preanesthetic Checklist  Completed: patient identified, IV checked, risks and benefits discussed, surgical consent, monitors and equipment checked and pre-op evaluation  Prep:  Pt Position:sitting  Sterile Tech:cap, gloves, mask and sterile barrier  Prep:chlorhexidine gluconate and isopropyl alcohol  Monitoring:blood pressure monitoring and continuous pulse oximetry  Epidural Block Procedure:  Approach:midline  Guidance:landmark technique  Location:L3-L4  Needle Type:Tuohy  Needle Gauge:17 and 17 G  Loss of Resistance Medium: air  Loss of Resistance: 6cm  Cath Depth at skin:12 cm  Paresthesia: none  Aspiration:negative  Test Dose:negative  Number of Attempts: 1  Post Assessment:  Dressing:occlusive dressing applied and secured with tape  Pt Tolerance:patient tolerated the procedure well with no apparent complications  Complications:no

## 2024-10-21 LAB
BACTERIA SPEC AEROBE CULT: NO GROWTH
BASOPHILS # BLD AUTO: 0.03 10*3/MM3 (ref 0–0.2)
BASOPHILS NFR BLD AUTO: 0.2 % (ref 0–1.5)
DEPRECATED RDW RBC AUTO: 36.3 FL (ref 37–54)
EOSINOPHIL # BLD AUTO: 0.06 10*3/MM3 (ref 0–0.4)
EOSINOPHIL NFR BLD AUTO: 0.3 % (ref 0.3–6.2)
ERYTHROCYTE [DISTWIDTH] IN BLOOD BY AUTOMATED COUNT: 12.5 % (ref 12.3–15.4)
HCT VFR BLD AUTO: 36.5 % (ref 34–46.6)
HGB BLD-MCNC: 11.6 G/DL (ref 12–15.9)
IMM GRANULOCYTES # BLD AUTO: 0.1 10*3/MM3 (ref 0–0.05)
IMM GRANULOCYTES NFR BLD AUTO: 0.6 % (ref 0–0.5)
LYMPHOCYTES # BLD AUTO: 2.48 10*3/MM3 (ref 0.7–3.1)
LYMPHOCYTES NFR BLD AUTO: 14.2 % (ref 19.6–45.3)
MCH RBC QN AUTO: 25.9 PG (ref 26.6–33)
MCHC RBC AUTO-ENTMCNC: 31.8 G/DL (ref 31.5–35.7)
MCV RBC AUTO: 81.5 FL (ref 79–97)
MONOCYTES # BLD AUTO: 1.25 10*3/MM3 (ref 0.1–0.9)
MONOCYTES NFR BLD AUTO: 7.1 % (ref 5–12)
NEUTROPHILS NFR BLD AUTO: 13.6 10*3/MM3 (ref 1.7–7)
NEUTROPHILS NFR BLD AUTO: 77.6 % (ref 42.7–76)
NRBC BLD AUTO-RTO: 0 /100 WBC (ref 0–0.2)
PLATELET # BLD AUTO: 289 10*3/MM3 (ref 140–450)
PMV BLD AUTO: 9.9 FL (ref 6–12)
RBC # BLD AUTO: 4.48 10*6/MM3 (ref 3.77–5.28)
WBC NRBC COR # BLD AUTO: 17.52 10*3/MM3 (ref 3.4–10.8)

## 2024-10-21 PROCEDURE — 0503F POSTPARTUM CARE VISIT: CPT | Performed by: STUDENT IN AN ORGANIZED HEALTH CARE EDUCATION/TRAINING PROGRAM

## 2024-10-21 PROCEDURE — 85025 COMPLETE CBC W/AUTO DIFF WBC: CPT | Performed by: OBSTETRICS & GYNECOLOGY

## 2024-10-21 RX ADMIN — DOCUSATE SODIUM 100 MG: 100 CAPSULE, LIQUID FILLED ORAL at 09:37

## 2024-10-21 RX ADMIN — DOCUSATE SODIUM 100 MG: 100 CAPSULE, LIQUID FILLED ORAL at 20:27

## 2024-10-21 NOTE — PLAN OF CARE
Goal Outcome Evaluation:         AVSS, Assessment WDL. Pt getting up and doing routine care and voiding without difficulty. Pain controled with tylenol. Breastfeeding well.   supportive at bedside.

## 2024-10-21 NOTE — PROGRESS NOTES
"[facility]  Vaginal Delivery Progress Note        Postpartum Day 1: Vaginal Delivery    The patient feels well.  Her pain is well controlled with nonsteroidal anti-inflammatory drugs and Tylenol.   She is ambulating well.  Patient describes her bleeding as  less than a period .    Breastfeeding: infant latching without pain.     Problem list:  Patient Active Problem List   Diagnosis    Alpha thalassemia silent carrier    Prenatal care, third trimester    Pregnant                 Vitals:    10/20/24 1511 10/20/24 2248 10/21/24 0714 10/21/24 1441   BP: 101/67 120/72 115/81 108/69   BP Location: Right arm Left arm Right arm Right arm   Patient Position: Lying Sitting Sitting Sitting   Pulse: 96 86 80 76   Resp: 18 18 18 16   Temp: 98.2 °F (36.8 °C) 97.4 °F (36.3 °C) 97.3 °F (36.3 °C) 97.9 °F (36.6 °C)   TempSrc: Oral Oral Oral Oral   SpO2:       Weight:       Height:            Flowsheet Rows      Flowsheet Row First Filed Value   Admission Height 157.5 cm (62\") Documented at 10/19/2024 1420   Admission Weight 76.7 kg (169 lb) Documented at 10/19/2024 1920            Physical Exam:  General:  no acute distresss.  Abdomen: abdomen is soft without significant tenderness, masses, organomegaly or guarding. Fundus: appropriate, firm, non tender  Extremities: normal, atraumatic, no cyanosis, and trace edema.       Lab results reviewed:  Yes   Rubella: Immune  Rh Status:  Positive  Immunizations:   There is no immunization history on file for this patient.          1. Eryn Humphrey is Day 1  post-partum  Vaginal, Spontaneous  .  Doing well, having some perineal pain and swelling, but otherwise no complaints.     Plan:  Continue current care.      Brigette Hawkins MD  10/21/2024  15:13 EDT    "

## 2024-10-21 NOTE — LACTATION NOTE
Pt wanting LC to check latch. Baby is latching well at this time. Pt reports tugging not pinching. Encouraged to call LC as needed.  Lactation Consult Note    Evaluation Completed: 10/21/2024 17:44 EDT  Patient Name: Eryn Humphrey  :  2005  MRN:  [id number]     REFERRAL  INFORMATION:                          Date of Referral: 10/20/24   Person Making Referral: patient, lactation consultant  Maternal Reason for Referral: breastfeeding currently  Infant Reason for Referral: sleepy, tight frenulum    DELIVERY HISTORY:        Skin to skin initiation date/time: 10/20/2024 7:10 AM  Skin to skin end date/time: 10/20/2024 8:15 AM       MATERNAL ASSESSMENT:                               INFANT ASSESSMENT:  Information for the patient's :  Nuvia Humphrey [[id number]]   No past medical history on file.                                                                                                  MATERNAL INFANT FEEDING:                                                                       EQUIPMENT TYPE:                                 BREAST PUMPING:          LACTATION REFERRALS:

## 2024-10-21 NOTE — PLAN OF CARE
Goal Outcome Evaluation:      VSS, fundus and lochia wdl. Pain controlled with prn medications. Breastfeeding infant.

## 2024-10-21 NOTE — LACTATION NOTE
Pt reports baby is BF good. She has been giving baby pacifier because he wanted to BF a lot last night. Encouraged to wait 2 weeks before giving pacifier so BF can get established. Reviewed booklet with pt. Educated on supply and demand, hand expression, how to know if baby is getting enough to eat, baby's feeding patterns, pumping and storing milk. Pt has [facility] info for f/u. Encouraged to BF at least every 2-3 hours on both breasts and call LC as needed.    Lactation Consult Note    Evaluation Completed: 10/21/2024 09:06 EDT  Patient Name: Eryn Humphrey  :  2005  MRN:  [id number]     REFERRAL  INFORMATION:                          Date of Referral: 10/20/24   Person Making Referral: patient, lactation consultant  Maternal Reason for Referral: breastfeeding currently  Infant Reason for Referral: sleepy, tight frenulum    DELIVERY HISTORY:        Skin to skin initiation date/time: 10/20/2024 7:10 AM  Skin to skin end date/time: 10/20/2024 8:15 AM       MATERNAL ASSESSMENT:                               INFANT ASSESSMENT:  Information for the patient's :  Nuvia Humphrey [[id number]]   No past medical history on file.                                                                                                  MATERNAL INFANT FEEDING:                                                                       EQUIPMENT TYPE:                                 BREAST PUMPING:          LACTATION REFERRALS:

## 2024-10-22 VITALS
TEMPERATURE: 97.9 F | SYSTOLIC BLOOD PRESSURE: 123 MMHG | BODY MASS INDEX: 31.1 KG/M2 | HEIGHT: 62 IN | HEART RATE: 86 BPM | WEIGHT: 169 LBS | DIASTOLIC BLOOD PRESSURE: 77 MMHG | RESPIRATION RATE: 16 BRPM | OXYGEN SATURATION: 100 %

## 2024-10-22 PROCEDURE — 0503F POSTPARTUM CARE VISIT: CPT | Performed by: OBSTETRICS & GYNECOLOGY

## 2024-10-22 RX ORDER — IBUPROFEN 600 MG/1
600 TABLET, FILM COATED ORAL EVERY 6 HOURS PRN
Qty: 30 TABLET | Refills: 0 | Status: SHIPPED | OUTPATIENT
Start: 2024-10-22

## 2024-10-22 RX ADMIN — DOCUSATE SODIUM 100 MG: 100 CAPSULE, LIQUID FILLED ORAL at 07:57

## 2024-10-22 NOTE — LACTATION NOTE
Lactation Consult Note  Mom reports she is exhausted, baby has cluster fed all night. He is nursing now, non-nutritive suckle and he is falling asleep. He has had one wet and one stool tonight.  Hand pump given. Mom pumped a few drops and formula given per request.   Discussed pace bottle feeding, milk production, pumping every 2-3hrs, give all EBM after pumping,  limit time with breast feeding if baby is showing feeding cues and encouraged OPLC.     Evaluation Completed: 10/22/2024 09:20 EDT  Patient Name: Eryn Humphrey  :  2005  MRN:  [id number]     REFERRAL  INFORMATION:                          Date of Referral: 10/22/24   Person Making Referral: patient  Maternal Reason for Referral: breastfeeding currently  Infant Reason for Referral: appears hungry after feeding    DELIVERY HISTORY:        Skin to skin initiation date/time: 10/20/2024 7:10 AM  Skin to skin end date/time: 10/20/2024 8:15 AM       MATERNAL ASSESSMENT:  Breast Size Issue: none (10/22/24 0900)  Breast Shape: round (10/22/24 0900)  Breast Density: soft (10/22/24 0900)  Areola: elastic (10/22/24 0900)  Nipples: everted (10/22/24 0900)                INFANT ASSESSMENT:  Information for the patient's :  Nuvia Humphrey [[id number]]   No past medical history on file.  Feeding Readiness Cues: eager (10/22/24 0900)     Feeding Tolerance/Success: arousal required (10/22/24 0900)                             Breastfeeding: breastfeeding, left side only (10/22/24 0900)  Infant Positioning: cross-cradle (10/22/24 0900)        Effective Latch During Feeding: no (10/22/24 0900)  Suck/Swallow/Breathing Coordination: absent (10/22/24 0900)  Signs of Milk Transfer: none noted (10/22/24 0900)      Latch: 1-->repeated attempts, holds nipple in mouth, stimulate to suck (10/22/24 0900)  Audible Swallowin-->none (10/22/24 0900)  Type of Nipple: 2-->everted (after stimulation) (10/22/24 0900)  Comfort (Breast/Nipple): 2-->soft/nontender  (10/22/24 0900)  Hold (Positioning): 2-->no assist from staff, mother able to position/hold infant (10/22/24 0900)  Latch Score: 7 (10/22/24 0900)    Infant-Driven Feeding Scales - Readiness: Prior to care, alert or fussy, sustains state and adequate tone throughout care. Exhibits sustained  hunger cues (e.g., rooting, hands to mouth, non-nutritive sucking).. (10/22/24 0900)              MATERNAL INFANT FEEDING:     Maternal Emotional State: relaxed (10/22/24 0900)  Infant Positioning: cross-cradle (10/22/24 0900)   Signs of Milk Transfer: none noted (10/22/24 0900)              Milk Ejection Reflex: present (10/22/24 0900)     Nipple Shape After Feeding, Left Breast: pinched (10/22/24 0900)     Latch Assistance: minimal assistance (10/22/24 0900)                               EQUIPMENT TYPE:  Breast Pump Type: manual pump (10/22/24 0900)  Breast Pump Flange Type: hard (10/22/24 0900)  Breast Pump Flange Size: 24 mm (10/22/24 0900)                        BREAST PUMPING:  Breast Pumping Interventions: frequent pumping encouraged (10/22/24 0900)       LACTATION REFERRALS:  Lactation Referrals: outpatient lactation program (10/22/24 0900)

## 2024-10-22 NOTE — PROGRESS NOTES
"Postpartum Vaginal Delivery Note PPD# 2    CC:  PPD 2    Assessment:     Pt doing well. Pain well controlled. Lochia normal. Ambulating well. Tolerating regular diet. Voiding without difficulty. No complaints.    Review of Systems - Negative except cramping    Vitals:   /77 (BP Location: Right arm, Patient Position: Sitting)   Pulse 86   Temp 97.9 °F (36.6 °C) (Oral)   Resp 16   Ht 157.5 cm (62\")   Wt 76.7 kg (169 lb)   SpO2 100%   Breastfeeding Yes   BMI 30.91 kg/m²       Exam:   Neck:  No LAD or TM  Lungs:  Non labored breathing, no tachypnea  Gen: NAD, cooperative, conversive  Abd: Soft, nondistended, fundus is firm below umbillicus, approp tender  Ext: Nontender bilaterally, edema- none    Labs:  Lab Results (last 24 hours)       Procedure Component Value Units Date/Time    Urine Culture - Urine, Urine, Clean Catch [[id number]]  (Normal) Collected: 10/19/24 1525    Specimen: Urine, Clean Catch Updated: 10/21/24 1053     Urine Culture No growth            Assessment:  PPD 2 s/p vaginal delivery  Patient Active Problem List   Diagnosis    Alpha thalassemia silent carrier    Prenatal care, third trimester    Pregnant       Plan: Eryn Humphrey is a 19 y.o. female  s/p  Vaginal delivery- PPD 2    1. Doing well, d/c home today  2. Precautions given  3. RTC in 6 weeks.   4. Ambulation encouraged.         Signed By:  Mey Gonzalez MD       2024 10:34 EDT        "

## 2024-10-22 NOTE — LACTATION NOTE
This note was copied from a baby's chart.  P1, 37/6.  Cluster feeding but doing well per parents. WL 4.8%. LC number on board. Mom has a PBP.

## 2024-10-22 NOTE — DISCHARGE SUMMARY
[facility]  Delivery Discharge Summary    Discharge Diagnosis:     Vaginal delivery      Patient Active Problem List   Diagnosis    Alpha thalassemia silent carrier    Prenatal care, third trimester    Pregnant        Primary OB Clinician: Danny    EDC: Estimated Date of Delivery: 24    Gestational Age:37w6d    Antepartum complications: none    Date of Delivery: 10/20/2024  Time of Delivery: 7:09 AM    Delivered By:  Fazal Ortega    Delivery Type: Vaginal, Spontaneous     Tubal Ligation: n/a    Baby: male infant;   Apgar:  9  @ 1 minute /   Apgar:  9  @ 5 minutes        Anesthesia: Epidural     Intrapartum complications: None    Laceration: Yes  Laceration Information  Laceration Repaired?   Perineal: 1st Yes   Periurethral:       Labial:       Sulcus:       Vaginal:       Cervical:             Episiotomy: No    Placenta: Spontaneous    Feeding method: Breastfeeding Status: Yes    Rh Immune globulin given: not applicable    Rubella vaccine given: no    Discharge Date: 10/22/2024; Discharge Time: 10:37 EDT    Early Discharge:  NO  Hospital Course:  The patient was admitted following delivery.  She did well postpartum with normal return of bowel function and remained afebrile.    Blood type: A+  Rubella immune  dTap given prenatally        Plan:    Follow-up appointment with Dr Delgado in 2 weeks.

## 2024-10-31 ENCOUNTER — MATERNAL SCREENING (OUTPATIENT)
Dept: CALL CENTER | Facility: HOSPITAL | Age: 19
End: 2024-10-31
Payer: MEDICAID

## 2024-10-31 NOTE — OUTREACH NOTE
Maternal Screening Survey      Flowsheet Row Responses   [facility]   [facility]? Yes   Call start time 1247   Call end time 1250   I have been able to laugh and see the funny side of things. 0   I have looked forward with enjoyment to things. 0   I have blamed myself unnecessarily when things went wrong. 0   I have been anxious or worried for no good reason. 2   I have felt scared or panicky for no good reason. 0   Things have been getting on top of me. 0   I have been so unhappy that I have had difficulty sleeping. 0   I have felt sad or miserable. 0   I have been so unhappy that I have been crying. 0   The thought of harming myself has occurred to me. 0   [place]  Depression Scale Total 2   Did any of your parents have problems with alcohol or drug use? No   Do any of your peers have problems with alcohol or drug use? No   Does your partner have problems with alcohol or drug use? No   Before you were pregnant did you have problems with alcohol or drug use? (past) No   In the past month, did you drink beer, wine, liquor or use any other drugs? (pregnancy) No   Maternal Screening call completed Yes   Call end time 1250              Mirian MUHAMMAD - Registered Nurse

## 2024-11-01 ENCOUNTER — OFFICE VISIT (OUTPATIENT)
Dept: OBSTETRICS AND GYNECOLOGY | Age: 19
End: 2024-11-01
Payer: MEDICAID

## 2024-11-01 VITALS
WEIGHT: 148 LBS | HEIGHT: 62 IN | BODY MASS INDEX: 27.23 KG/M2 | DIASTOLIC BLOOD PRESSURE: 62 MMHG | SYSTOLIC BLOOD PRESSURE: 112 MMHG

## 2024-11-01 NOTE — PROGRESS NOTES
"Subjective     Chief Complaint   Patient presents with    Postpartum Care     2wk pp- vaginal delivery 10/20/2024(baby boy) breastfeeding, no concerns doing well       Baby's name: ALVA Humphrey is a 19 y.o. female who presents for a postpartum visit. She is 2 weeks postpartum following a spontaneous vaginal delivery. I have fully reviewed the prenatal and intrapartum course. Postpartum course has been uneventful.   Doing well.   Breast-feeding and pumping.  Completing pelvic rest, no intercourse.  Sometimes feels sore vaginally after standing for long periods of time.  Normal bladder activity and bowel movements. No fevers. Patient declines pelvic exam today.   Not interested in starting birth control.      Postpartum inventory   Infant feeding: Breast  Postpartum pain: Mild (pain 1-3)  Vaginal bleeding : Mostly moderate - requiring pad  Depression/Anxiety: None  Contracepton: None  COMMENTS: Doing pelvic rest, no intercourse.    The thought of harming myself has occurred to me.: 0  [place]  Depression Scale Total: 3        The following portions of the patient's history were reviewed and updated as appropriate: allergies, current medications, past family history, past medical history, past social history, past surgical history and problem list.      /62   Ht 157.5 cm (62\")   Wt 67.1 kg (148 lb)   Breastfeeding Yes   BMI 27.07 kg/m²         Review of Systems   Constitutional: Negative.    HENT: Negative.     Eyes: Negative.    Respiratory: Negative.     Cardiovascular: Negative.    Gastrointestinal: Negative.    Endocrine: Negative.    Genitourinary: Negative.    Musculoskeletal: Negative.    Skin: Negative.    Allergic/Immunologic: Negative.    Neurological: Negative.    Hematological: Negative.    Psychiatric/Behavioral: Negative.         Objective   Physical Exam  Constitutional:       General: She is not in acute distress.  Cardiovascular:      Rate and Rhythm: Normal rate and " regular rhythm.      Pulses: Normal pulses.      Heart sounds: Normal heart sounds.   Pulmonary:      Effort: Pulmonary effort is normal.      Breath sounds: Normal breath sounds.   Neurological:      Mental Status: She is alert and oriented to person, place, and time.   Psychiatric:         Mood and Affect: Mood normal.         Behavior: Behavior normal.         Thought Content: Thought content normal.         Judgment: Judgment normal.           Assessment & Plan   Diagnoses and all orders for this visit:    1. Postpartum follow-up (Primary)    2.  (spontaneous vaginal delivery)      -Normal postpartum exam.   -Contraception: none, patient declines starting birth control.  -Slow return to normal activities reviewed.   -Continue pelvic rest until after 6-week postpartum visit.    All questions answered. Patient verbalizes understanding and is agreeable to plan.  Return in about 4 weeks (around 2024) for postpartum visit with Dr. Delgado - 6 wk pp.

## 2024-12-09 ENCOUNTER — TELEPHONE (OUTPATIENT)
Dept: OBSTETRICS AND GYNECOLOGY | Age: 19
End: 2024-12-09